# Patient Record
Sex: FEMALE | Race: WHITE | Employment: OTHER | URBAN - METROPOLITAN AREA
[De-identification: names, ages, dates, MRNs, and addresses within clinical notes are randomized per-mention and may not be internally consistent; named-entity substitution may affect disease eponyms.]

---

## 2017-08-07 ENCOUNTER — HOSPITAL ENCOUNTER (OUTPATIENT)
Dept: INTERVENTIONAL RADIOLOGY/VASCULAR | Age: 82
Discharge: HOME | End: 2017-08-07
Attending: ORTHOPAEDIC SURGERY | Admitting: ORTHOPAEDIC SURGERY
Payer: MEDICARE

## 2017-08-07 VITALS
HEIGHT: 64 IN | HEART RATE: 45 BPM | TEMPERATURE: 98.1 F | RESPIRATION RATE: 18 BRPM | SYSTOLIC BLOOD PRESSURE: 153 MMHG | WEIGHT: 117 LBS | OXYGEN SATURATION: 100 % | DIASTOLIC BLOOD PRESSURE: 79 MMHG | BODY MASS INDEX: 19.97 KG/M2

## 2017-08-07 DIAGNOSIS — M48.061 SPINAL STENOSIS, LUMBAR REGION, WITHOUT NEUROGENIC CLAUDICATION: ICD-10-CM

## 2017-08-07 DIAGNOSIS — M54.31 RIGHT SIDED SCIATICA: ICD-10-CM

## 2017-08-07 DIAGNOSIS — M43.06 LUMBAR SPONDYLOLYSIS: ICD-10-CM

## 2017-08-07 PROCEDURE — 64483 NJX AA&/STRD TFRM EPI L/S 1: CPT

## 2017-08-07 PROCEDURE — 74011636320 HC RX REV CODE- 636/320: Performed by: RADIOLOGY

## 2017-08-07 PROCEDURE — 77030003666 HC NDL SPINAL BD -A

## 2017-08-07 PROCEDURE — 74011250636 HC RX REV CODE- 250/636: Performed by: RADIOLOGY

## 2017-08-07 PROCEDURE — 74011000250 HC RX REV CODE- 250: Performed by: RADIOLOGY

## 2017-08-07 RX ORDER — LIDOCAINE HYDROCHLORIDE 10 MG/ML
15 INJECTION, SOLUTION EPIDURAL; INFILTRATION; INTRACAUDAL; PERINEURAL
Status: COMPLETED | OUTPATIENT
Start: 2017-08-07 | End: 2017-08-07

## 2017-08-07 RX ORDER — METHYLPREDNISOLONE ACETATE 40 MG/ML
120 INJECTION, SUSPENSION INTRA-ARTICULAR; INTRALESIONAL; INTRAMUSCULAR; SOFT TISSUE
Status: COMPLETED | OUTPATIENT
Start: 2017-08-07 | End: 2017-08-07

## 2017-08-07 RX ADMIN — METHYLPREDNISOLONE ACETATE 120 MG: 40 INJECTION, SUSPENSION INTRA-ARTICULAR; INTRALESIONAL; INTRAMUSCULAR; SOFT TISSUE at 10:23

## 2017-08-07 RX ADMIN — LIDOCAINE HYDROCHLORIDE 15 ML: 10 INJECTION, SOLUTION EPIDURAL; INFILTRATION; INTRACAUDAL; PERINEURAL at 10:23

## 2017-08-07 RX ADMIN — IOHEXOL 20 ML: 180 INJECTION INTRAVENOUS at 10:23

## 2017-08-07 NOTE — PROGRESS NOTES
0945: pt to angio holding for holly  1030: I have reviewed discharge instructions with the patient. The patient verbalized understanding.

## 2017-08-07 NOTE — PROCEDURES
PROCEDURE:TFESI. INDICATION:right-sided pain. ANESTHESIA:local.  COMPLICATION:NONE. SPECIMENS REMOVED:none. BLOOD LOSS:NONE. /ASSISTANT:TIFFANY Foley RECOMMENDATIONS:none. CONSENT OBTAINED:YES.  NOTES:none.

## 2017-08-07 NOTE — DISCHARGE INSTRUCTIONS
Steroid Injection Discharge Instructions    General Information:   A steroid injection was performed today, placing a combination of a steroid and an anesthetic (numbing medicine) into the space around the nerves of your spine. This is done to treat back pain. It may take 7-10 days for the injection to reach its full potential.  This procedure can be done at any level of the spinal column, depending on where your pain is. Your doctor will have ordered the appropriate level to be treated prior to your coming in for the procedure. Home Care Instructions: You can resume your regular diet. Do not drink alcohol today. You may notice that you have to use your pain medications less after your injection. Some people do not notice much of a change in their pain after the first injection. If that is the case, it is worth your time to have a second one done. This is why these injections are sometimes ordered in a series of three. Keep the puncture site clean and dry for 24 hours, and then you may remove the dressing. Showering is acceptable after the bandage is removed. Rest today be aware there maybe numbness or tingling that may last up to 12 hours after the inject. Call If:   You should call your Physician and/or the Radiology Nurse if you have any bleeding other than a small spot on your bandage. Call if you have any signs of infection, fever, increased pain at the puncture site, confusion, or a headache that worsens when you stand and eases when lying flat. Follow-Up Instructions:  Please see your ordering doctor as he/she has requested. Let your doctor know if you have relief from your pain so they may schedule another injection for you if it is indicated. To Reach Us:  Side effects of sedation medications and other medications used today have been reviewed. Notify us of nausea, itching, hives, dizziness, or anything else out of the ordinary.       Should you experience any of these significant changes, please call [phone] between the hours of 7:30 am and 10 pm or [phone] after hours.  After hours, ask the  to page the [address] Technologist, and describe the problem to the technologist.

## 2019-01-25 ENCOUNTER — HOSPITAL ENCOUNTER (EMERGENCY)
Age: 84
Discharge: HOME | End: 2019-01-25
Attending: EMERGENCY MEDICINE | Admitting: EMERGENCY MEDICINE
Payer: MEDICARE

## 2019-01-25 ENCOUNTER — APPOINTMENT (OUTPATIENT)
Dept: CT IMAGING | Age: 84
End: 2019-01-25
Attending: EMERGENCY MEDICINE
Payer: MEDICARE

## 2019-01-25 VITALS
HEART RATE: 62 BPM | WEIGHT: 114.86 LBS | DIASTOLIC BLOOD PRESSURE: 74 MMHG | HEIGHT: 63 IN | RESPIRATION RATE: 16 BRPM | BODY MASS INDEX: 20.35 KG/M2 | TEMPERATURE: 97.9 F | SYSTOLIC BLOOD PRESSURE: 121 MMHG | OXYGEN SATURATION: 100 %

## 2019-01-25 DIAGNOSIS — S51.811A SKIN TEAR OF RIGHT FOREARM WITHOUT COMPLICATION, INITIAL ENCOUNTER: ICD-10-CM

## 2019-01-25 DIAGNOSIS — S22.000A CLOSED COMPRESSION FRACTURE OF THORACIC VERTEBRA, INITIAL ENCOUNTER (HCC): Primary | ICD-10-CM
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
## 2019-01-25 PROCEDURE — 74011000250 HC RX REV CODE- 250: Performed by: EMERGENCY MEDICINE

## 2019-01-25 PROCEDURE — 74011250637 HC RX REV CODE- 250/637: Performed by: EMERGENCY MEDICINE

## 2019-01-25 PROCEDURE — 72131 CT LUMBAR SPINE W/O DYE: CPT

## 2019-01-25 PROCEDURE — 97161 PT EVAL LOW COMPLEX 20 MIN: CPT

## 2019-01-25 PROCEDURE — 97116 GAIT TRAINING THERAPY: CPT

## 2019-01-25 PROCEDURE — 99285 EMERGENCY DEPT VISIT HI MDM: CPT

## 2019-01-25 RX ORDER — LIDOCAINE 50 MG/G
PATCH TOPICAL
Qty: 1 PACKAGE | Refills: 1 | Status: ON HOLD | OUTPATIENT
Start: 2019-01-25 | End: 2021-03-19

## 2019-01-25 RX ORDER — ACETAMINOPHEN 325 MG/1
650 TABLET ORAL
Status: COMPLETED | OUTPATIENT
Start: 2019-01-25 | End: 2019-01-25

## 2019-01-25 RX ORDER — ACETAMINOPHEN 500 MG
1000 TABLET ORAL 2 TIMES DAILY
COMMUNITY

## 2019-01-25 RX ORDER — IBUPROFEN 400 MG/1
400 TABLET ORAL
Status: COMPLETED | OUTPATIENT
Start: 2019-01-25 | End: 2019-01-25

## 2019-01-25 RX ORDER — LIDOCAINE 50 MG/G
2 PATCH TOPICAL EVERY 24 HOURS
Status: DISCONTINUED | OUTPATIENT
Start: 2019-01-25 | End: 2019-01-25 | Stop reason: HOSPADM

## 2019-01-25 RX ADMIN — ACETAMINOPHEN 650 MG: 325 TABLET ORAL at 10:17

## 2019-01-25 RX ADMIN — IBUPROFEN 400 MG: 400 TABLET, FILM COATED ORAL at 10:17

## 2019-01-25 NOTE — ED NOTES
Bedside and Verbal shift change report given to Nuzhat Godinez RN (oncoming nurse) by Caleb Escobedo RN (offgoing nurse). Report included the following information SBAR, ED Summary, MAR and Recent Results.

## 2019-01-25 NOTE — ED TRIAGE NOTES
Pt arrives via EMS from [facility] for GLF this morning. Pt reports falling backwards onto the floor. Denies LOC, anticoagulants. Pts only complaint is lower back pain

## 2019-01-25 NOTE — ED NOTES
1200: Report received from Shelvia Cogan, RN. Patient requesting to speak to MD for results. MD notified. PT called to ambulate patient. 1300: PT in room to ambulate patient. 1330: The MD has reviewed discharge instructions with the patient. The patient verbalized understanding. Patient waiting for room assignment at assisted living, see BSW note. 1415: Report called to Sancho Zaragoza RN. I have reviewed discharge instructions with the patient and daughter. The patient verbalized understanding. Daughter to drive patient back to facility.

## 2019-01-25 NOTE — DISCHARGE INSTRUCTIONS
Patient Education        Compression Fracture of the Spine: Care Instructions  Your Care Instructions    A compression fracture happens when the front part of a spinal bone breaks and collapses. A fall or other accident can cause it. A minor injury or moving the wrong way can cause a break if you have thin or brittle bones (osteoporosis). These types of breaks will heal in 8 to 10 weeks. You will need rest and pain medicines. Your doctor may recommend physical therapy. Some doctors recommend that certain people with compression fractures wear braces. Your doctor also may treat thin or brittle bones. You may need surgery if you have lasting pain or if the bone presses on the spinal cord or nerves. You heal best when you take good care of yourself. Eat a variety of healthy foods, and don't smoke. Follow-up care is a key part of your treatment and safety. Be sure to make and go to all appointments, and call your doctor if you are having problems. It's also a good idea to know your test results and keep a list of the medicines you take. How can you care for yourself at home? · Be safe with medicines. Read and follow all instructions on the label. ? If the doctor gave you a prescription medicine for pain, take it as prescribed. ? If you are not taking a prescription pain medicine, ask your doctor if you can take an over-the-counter medicine. · Talk to your doctor about how to make your bones stronger. You may need medicines or a change in what you eat. · Be active only as directed by your doctor. When should you call for help? Call 911 anytime you think you may need emergency care. For example, call if:    · You are unable to move an arm or a leg at all.   [facility] your doctor now or seek immediate medical care if:    · You have new or worse symptoms in your arms, legs, belly, or buttocks. Symptoms may include:  ? Numbness or tingling. ? Weakness.   ? Pain.     · You lose bladder or bowel control.     · You have belly pain, bloating, vomiting, or nausea.    Watch closely for changes in your health, and be sure to contact your doctor if:    · You do not get better as expected. Where can you learn more? Go to http://isidra-monico.info/. Enter P445 in the search box to learn more about \"Compression Fracture of the Spine: Care Instructions. \"  Current as of: September 20, 2018  Content Version: 11.9  © 6146-7548 [employer]. Care instructions adapted under license by [employer] (which disclaims liability or warranty for this information). If you have questions about a medical condition or this instruction, always ask your healthcare professional. [place] [address] any warranty or liability for your use of this information.

## 2019-01-25 NOTE — PROGRESS NOTES
Date of previous inpatient admission/ ED visit? 8/7/17 OP Procedure What brought the patient back to ED? Patient presents to the ED s/p fall Did patient decline recommended services during last admission/ ED visit (if yes, what)? NA Has patient seen a provider since their last inpatient admission/ED visit (if yes, when)? Yes. PCP is Abby Downing seen on 1/24/19 at [facility] CM Interventions: 
From previous inpatient admission/ED visit: NA From current inpatient admission/ED visit: Assessment SSED/CM consult received and appreciated. EMR reviewed. Case discussed w/ Dr.Jeffery Purvis and Mac Byrnes, PT - recommendation admit to [facility] . Met w/ patient and daughter/WEN Shankar Host - introduced to role of CM. Patient provided history - lives in independent living at [place] [address] since 2014. DME includes a rollator. Previous Healthcare stay June 2018 (carpal tunnel surgery) and has had OP PT on site. Call placed to [place] [address] attempted to leave message for SW. Call placed back and connected to Felix Huang [phone] informed of bed needed. Medicals can be faxed to [phone]. SW to contact this writer back w/ available bed and Toni Baugh will drive to facility once other daughter brings clothes. No additional transitional care needs verbalized. Order, Facesheet, and AVS faxed to facility via [employer]. [employer] is insurance provider. Care Management Interventions PCP Verified by CM: Yes Last Visit to PCP: 01/24/19 Palliative Care Criteria Met (RRAT>21 & CHF Dx)?: No 
Transition of Care Consult (CM Consult): Discharge Planning, SNF MyChart Signup: No 
Discharge Durable Medical Equipment: No 
Health Maintenance Reviewed: Yes Physical Therapy Consult: Yes Occupational Therapy Consult: No 
Speech Therapy Consult: No 
Current Support Network: Lives Alone Confirm Follow Up Transport: Family Plan discussed with Pt/Family/Caregiver: Yes Freedom of Choice Offered:  Yes 
 [employer] Information Provided?: No 
Discharge Location Discharge Placement: Skilled nursing facility Room [facility]. [phone] obtained. Report will need to be called to Nurse Smita Salas [phone] Updates provided to patient/ MPOA.

## 2019-01-25 NOTE — ED PROVIDER NOTES
80 y.o. female with past medical history significant for scoliosis who presents from [facility] via EMS with chief complaint of severe back pain s/p GLF. Patient states this morning, she lost her balance while putting on her robe, causing her to fall backwards. Patient reports landing flat on her back, denying head injury or LOC. Patient arrives to the ED complaining of bilateral lower back pain, with a skin tear to her right forearm. Patient is unsure of her last tetanus shot. Patient reports having a Rolator to use at home. Patient denies any other pain or injuries. Pt denies any anticoagulation use. There are no other acute medical concerns at this time. Old Chart Review: On 8/7/17, the patient had a TFESI at [facility]. Social hx: former smoker; Current EtOH use PCP: Barbara Olivares MD 
Ortho: Deborah Cyr MD 
 
Note written by Brody Abdi, as dictated by Liborio Peraza,  10:03 AM 
 
 
The history is provided by the patient, medical records and the EMS personnel. No  was used. Past Medical History:  
Diagnosis Date  Scoliosis Past Surgical History:  
Procedure Laterality Date  HX ORTHOPAEDIC History reviewed. No pertinent family history. Social History Socioeconomic History  Marital status:  Spouse name: Not on file  Number of children: Not on file  Years of education: Not on file  Highest education level: Not on file Social Needs  Financial resource strain: Not on file  Food insecurity - worry: Not on file  Food insecurity - inability: Not on file  Transportation needs - medical: Not on file  Transportation needs - non-medical: Not on file Occupational History  Not on file Tobacco Use  Smoking status: Former Smoker  Smokeless tobacco: Never Used Substance and Sexual Activity  Alcohol use: Yes Alcohol/week: 4.2 oz Types: 7 Glasses of wine per week Frequency: Never  Drug use: No  
 Sexual activity: Not on file Other Topics Concern  Not on file Social History Narrative  Not on file ALLERGIES: Patient has no known allergies. Review of Systems Cardiovascular: Negative for chest pain. Gastrointestinal: Negative for abdominal pain. Musculoskeletal: Positive for back pain. Negative for arthralgias and myalgias. Skin: Positive for wound. Neurological: Negative for syncope and headaches. All other systems reviewed and are negative. Vitals:  
 01/25/19 1002 BP: 148/68 Pulse: (!) 59 Resp: 16 Temp: 97.9 °F (36.6 °C) SpO2: 100% Weight: 52.1 kg (114 lb 13.8 oz) Height: 5' 3\" (1.6 m) Physical Exam  
Skin: Skin is warm and dry. Abrasion and bruising noted. She is not diaphoretic. Constitutional: Pt is awake and alert. NAD. Elderly, frail, and thin. HENT:  
Head: Normocephalic and atraumatic. Nose: Nose normal.  
Mouth/Throat: Oropharynx is clear and moist. No oropharyngeal exudate. Eyes: Conjunctivae and extraocular motions are normal. Pupils are equal, round, and reactive to light. Right eye exhibits no discharge. Left eye exhibits no discharge. No scleral icterus. Neck: No tracheal deviation present. Supple neck. Cardiovascular: Normal rate, regular rhythm, normal heart sounds and intact distal pulses. Exam reveals no gallop and no friction rub. No murmur heard. Pulmonary/Chest: Effort normal and breath sounds normal.  Pt  has no wheezes. Pt  has no rales. Abdominal: Soft. Pt  exhibits no distension and no mass. No tenderness. Pt  has no rebound and no guarding. Musculoskeletal:  Pt  exhibits no edema. FROM of ankles, knees, and hips without pain. Midline lumbar spine tenderness to palpation, no bruising or signs of trauma noted. Ext: Normal ROM in all four extremities; not tender to palpation; distal pulses are normal, no edema. Neurological:  Pt is alert. nonfocal neuro exam. 
Psychiatric:  Pt  has a normal mood and affect. Behavior is normal.  
 
Note written by Brody Jay, as dictated by Leanna Mallory DO 10:03 AM 
 
MDM Procedures 12:04 PM 
Reviewed the patient's CT images. She has a T12 compression fracture. 12:10 PM 
Updated the patient of her results. Will consult Case Management to assist with dispo. 1:18 PM 
Case Management is consulted regarding placing pt in a healthcare bed at [facility]. CONSULT NOTE: 
1:24 PM Leanna Mallory DO spoke with Dr. Rosario Tate, Consult for Neurosurgery. Discussed available diagnostic tests and clinical findings. Dr. Rosario Tate recommends 2 week follow up in the office. Regarding T12 compression fx, definitive fx care given. Yonathan Villafuerte DO Consulted PT as well Discussed plan and f/u with daughter - gave her copy of AVS. 
 
Will try lidoderm patches, and otc pain meds for now. Dr can order opiate if needed. I am avoiding opiates due to her frail, elderly status - d/w daughter.

## 2019-01-26 NOTE — PROGRESS NOTES
physical Therapy Emergency Department EVALUATION/DISCHARGE Patient: Esthela Mckeon (89 y.o. female) Date: 1/25/2019 Primary Diagnosis: No admission diagnoses are documented for this encounter. Precautions: Fall ASSESSMENT : 
Patient is ambulatory with her rollator walker in the ED with safety concerns related to her technique (tends to park the walker and attempt to walk a short distance to reach the bed and toilet during this visit). She was not using her walker when she fell earlier today. She requires assistance for LE management during bed mobility and with sit to stand today which will make it difficult for her to manage alone in her independent living apartment. Patient is appropriate to return to the healthcare unit at her [address] where she will have assistance of staff and receive therapy before returning to her  apartment. Further acute physical therapy is not indicated at this time. PLAN : 
Discharge Recommendations:  
 
[]   Home with family []   Skilled nursing facility []   Admission to hospital with rehab likely needed 
[]   Inpatient rehab referral 
[]   Outpatient physical therapy referral 
[x]   Other: Healthcare Further Equipment Recommendations for Discharge:  None 
[]   Rolling walker with 5\" wheels 
[]   Crutches  
[]   Cane  
[]   Wheelchair  
[]   Other: COMMUNICATION/EDUCATION:  
Communication/Collaboration: 
[x]   Fall prevention education was provided and the patient/caregiver indicated understanding. [x]   Patient/family have participated as able and agree with findings and recommendations. []   Patient is unable to participate in plan of care at this time. Findings and recommendations were discussed with: Nursing and Care Management. Nursing to provide report to MD.  
 
 
SUBJECTIVE:  
Patient stated Rosalynd Favors you take this off so I can go home (referring to leads, bp cuff).  OBJECTIVE DATA SUMMARY:  
HISTORY:   
 Past Medical History:  
Diagnosis Date  Scoliosis Past Surgical History:  
Procedure Laterality Date  HX ORTHOPAEDIC Prior Level of Function/Home Situation: Independent with rollator walker. Does not use the walker inside her apartment. Denies any other history of falls. Personal factors and/or comorbidities impacting plan of care:  
 
Home Situation Home Environment: Independent living([facility]) # Steps to Enter: 0 One/Two Story Residence: One story(accesible by elevator) Living Alone: Yes Support Systems: Child(kallie) Patient Expects to be Discharged to[de-identified] Other (comment) Current DME Used/Available at Home: [employer]) EXAMINATION/PRESENTATION/DECISION MAKING: Critical Behavior: 
Neurologic State: Alert, Eyes open spontaneously Orientation Level: Oriented X4 Cognition: Follows commands Hearing: Auditory Auditory Impairment: None Range Of Motion: 
AROM: Generally decreased, functional 
  
  
  
  
  
  
  
Strength:   
Strength: Generally decreased, functional 
  
  
  
  
  
  
Tone & Sensation:  
Tone: Normal 
  
  
  
  
Sensation: Intact Coordination: 
Coordination: Within functional limits Vision:  
  
Functional Mobility: 
Bed Mobility: 
  
Supine to Sit: Moderate assistance Scooting: Moderate assistance Transfers: 
Sit to Stand: Minimum assistance Stand to Sit: Minimum assistance Balance:  
Sitting: Intact Standing: Intact; With support Ambulation/Gait Training: 
Distance (ft): 75 Feet (ft) Assistive Device: Gait belt;Walker, rollator Ambulation - Level of Assistance: Supervision;Contact guard assistance Gait Description (WDL): Exceptions to [facility] Gait Abnormalities: Antalgic Base of Support: Widened Step Length: Left shortened;Right shortened Interventions: Verbal cues(to correct walker placement and use) Special Tests: 
Timed up and go: Timed Get Up And Go Test: > 30 seconds < than 10 seconds=Normal  Greater then 13.5 seconds (in elderly)=Increased fall risk Sophia DEY. Predicting the probability for falls in community dwelling older adults using the Timed Up and Go Test. Phys Ther. 2000;80:896-903. Physical Therapy Evaluation Charge Determination History Examination Presentation Decision-Making LOW Complexity : Zero comorbidities / personal factors that will impact the outcome / POC LOW Complexity : 1-2 Standardized tests and measures addressing body structure, function, activity limitation and / or participation in recreation  LOW Complexity : Stable, uncomplicated  LOW Complexity : FOTO score of  Based on the above components, the patient evaluation is determined to be of the following complexity level: LOW Pain: 
Pain Scale 1: Numeric (0 - 10) Pain Intensity 1: 4 Pain Location 1: Back Pain Orientation 1: Lower Activity Tolerance:  
Pain limits bed mobility and transfers. Tolerated walking fairly well with less pain complaints. Please refer to the flowsheet for vital signs taken during this treatment. After treatment:  
[x]         Patient left in no apparent distress sitting up in chair 
[]         Patient left in no apparent distress in bed 
[x]         Call bell left within reach [x]         Nursing notified 
[x]         Caregiver present 
[]         Bed alarm activated Thank you for this referral. 
Radha Hilliard, PT Time Calculation: 30 mins

## 2019-04-02 ENCOUNTER — OFFICE VISIT (OUTPATIENT)
Dept: NEUROLOGY | Age: 84
End: 2019-04-02

## 2019-04-02 ENCOUNTER — TELEPHONE (OUTPATIENT)
Dept: NEUROLOGY | Age: 84
End: 2019-04-02

## 2019-04-02 VITALS
DIASTOLIC BLOOD PRESSURE: 72 MMHG | WEIGHT: 108 LBS | BODY MASS INDEX: 19.88 KG/M2 | HEART RATE: 57 BPM | SYSTOLIC BLOOD PRESSURE: 138 MMHG | HEIGHT: 62 IN

## 2019-04-02 DIAGNOSIS — R20.2 NUMBNESS AND TINGLING: Primary | ICD-10-CM

## 2019-04-02 DIAGNOSIS — R20.0 NUMBNESS AND TINGLING: Primary | ICD-10-CM

## 2019-04-02 DIAGNOSIS — G20 PD (PARKINSON'S DISEASE) (HCC): ICD-10-CM

## 2019-04-02 RX ORDER — DORZOLAMIDE HYDROCHLORIDE AND TIMOLOL MALEATE 20; 5 MG/ML; MG/ML
1 SOLUTION/ DROPS OPHTHALMIC 2 TIMES DAILY
COMMUNITY

## 2019-04-02 RX ORDER — TRAMADOL HYDROCHLORIDE 50 MG/1
50 TABLET ORAL
Status: ON HOLD | COMMUNITY
End: 2021-03-19

## 2019-04-02 RX ORDER — MELOXICAM 7.5 MG/1
7.5 TABLET ORAL DAILY
COMMUNITY

## 2019-04-02 RX ORDER — CARBIDOPA AND LEVODOPA 25; 100 MG/1; MG/1
1 TABLET ORAL 3 TIMES DAILY
Qty: 90 TAB | Refills: 2 | Status: SHIPPED | OUTPATIENT
Start: 2019-04-02 | End: 2019-06-25 | Stop reason: SDUPTHER

## 2019-04-02 RX ORDER — CYANOCOBALAMIN 1000 UG/ML
1000 INJECTION, SOLUTION INTRAMUSCULAR; SUBCUTANEOUS EVERY 2 WEEKS
COMMUNITY

## 2019-04-02 RX ORDER — LOPERAMIDE HYDROCHLORIDE 2 MG/1
2 CAPSULE ORAL
COMMUNITY

## 2019-04-02 RX ORDER — DOCUSATE SODIUM 100 MG/1
100 CAPSULE, LIQUID FILLED ORAL
COMMUNITY

## 2019-04-02 RX ORDER — ACETAMINOPHEN 325 MG/1
TABLET ORAL
Status: ON HOLD | COMMUNITY
End: 2021-03-19

## 2019-04-02 RX ORDER — OMEPRAZOLE 20 MG/1
20 CAPSULE, DELAYED RELEASE ORAL DAILY
COMMUNITY

## 2019-04-02 NOTE — PROGRESS NOTES
Neurology Note    Chief Complaint   Patient presents with    Follow-up     dizzy       HPI/Subjective  Nati Henderson is a 80 y.o. female who presented to the neurology office for management of dizziness. Patient states that it is off and on. The daughter is there as well which states patient is having shuffling gait. Has been gradually progressing. Has been going on for the last 1 year or so. She is slower in doing activity. She has a hard time getting in and out of car. She is slowly meeting was well. It takes her longer for dressing. The patient states that everything is fine. The patient recently had a fall and hit her head backwards. She does also have poor balance. The patient also states that she has abnormal sensation in her toes. Patient has been referred here for further management    Current Outpatient Medications   Medication Sig    acetaminophen (TYLENOL) 325 mg tablet Take  by mouth every four (4) hours as needed for Pain.  cyanocobalamin (VITAMIN B12) 1,000 mcg/mL injection 1,000 mcg by IntraMUSCular route once. IM every 12 weeks    docusate sodium (COLACE) 100 mg capsule Take 100 mg by mouth two (2) times a day.  dorzolamide-timolol (COSOPT) 22.3-6.8 mg/mL ophthalmic solution Administer 1 Drop to both eyes two (2) times a day.  loperamide (IMODIUM) 2 mg capsule Take  by mouth.  traMADol (ULTRAM) 50 mg tablet Take 50 mg by mouth every six (6) hours as needed for Pain.  meloxicam (MOBIC) 7.5 mg tablet Take  by mouth daily.  omeprazole (PRILOSEC) 20 mg capsule Take 20 mg by mouth daily.  carbidopa-levodopa (SINEMET)  mg per tablet Take 1 Tab by mouth three (3) times daily. No current facility-administered medications for this visit.       Allergies   Allergen Reactions    Codeine Nausea Only    Erythromycin Nausea Only    Zithromax [Azithromycin] Nausea Only     Past Medical History:   Diagnosis Date    Arthritis      No past surgical history on file.  No family history on file. Social History     Tobacco Use    Smoking status: Not on file   Substance Use Topics    Alcohol use: Not on file    Drug use: Not on file       REVIEW OF SYSTEMS:   A ten system review of constitutional, cardiovascular, respiratory, musculoskeletal, endocrine, skin, SHEENT, genitourinary, psychiatric and neurologic systems was obtained and is unremarkable with the exception of Anxiety, neck swelling, joint pain, muscle weakness, neuropathy and vertigo      EXAMINATION:   Visit Vitals  /72   Pulse (!) 57   Ht 5' 2\" (1.575 m)   Wt 108 lb (49 kg)   BMI 19.75 kg/m²        General:   General appearance: Pt is in no acute distress   Distal pulses are preserved  Fundoscopic Exam: Normal    Neurological Examination:   Mental Status: AAO x3. Speech is fluent. Follows commands, has normal fund of knowledge, attention, short term recall, comprehension and insight. Cranial Nerves: Visual fields are full. PERRL, Extraocular movements are full. Facial sensation intact. Facial movement intact. Hearing intact to conversation. Palate elevates symmetrically. Shoulder shrug symmetric. Tongue midline. Motor: Strength is 5/5 in all 4 ext. No atrophy. Tone: Cogwheel rigidity    Sensation: Decreased vibration sensation at toes    Reflexes: DTRs 2+ with 1+ at knees and 1+ at ankles. Coordination/Cerebellar: Intact to finger-nose-finger     Gait: Ataxic and needs to hold my hand for walking. At baseline she uses a wheeled walker    Skin: No significant bruising or lacerations. Pull test is positive. Fast finger tapping is slow    Laboratory review:   No results found for this or any previous visit. Imaging review:  None    Documentation review:  None    Assessment/Plan:   Julia Dykes is a 80 y.o. female who presented to the neurology office for dizziness. Based on history she does have shuffling gait and bradykinesia.   On examination I noticed cogwheel rigidity and bradykinesia on fast finger tapping. Pull test is positive. I do suspect Parkinson's disease. We will start the patient on Sinemet 25/100 mg p.o. 3 times daily. If there is no improvement will send the patient for a DaTscan at [facility]. 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 We will scheduled her for EMG/nerve conduction study. Follow-up in 3 months    No flowsheet data found. Primary care to address possible depression if PHQ-9 score is more than 9. ICD-10-CM ICD-9-CM    1. Numbness and tingling R20.0 782. 0 EMG LIMITED    R20.2     2. PD (Parkinson's disease) ([facility] G20 332.0 carbidopa-levodopa (SINEMET)  mg per tablet      Thank you for allowing me to participate in the care of Ms. Olegario Castanon. Please feel free to contact me if you have any questions. Lorenzo Duarte MD  Neurologist    CC: Ana Paula Reynolds MD  Fax: [phone]    This note was created using voice recognition software. Despite editing, there may be syntax errors.

## 2019-04-02 NOTE — TELEPHONE ENCOUNTER
Patients daughter would like to know if massage therapy would help the patients neck stiffness.   Dr Karli Daniel please advise

## 2019-04-02 NOTE — LETTER
4/2/19 Patient: Dayton Monsivais YOB: 1931 Date of Visit: 4/2/2019 Ange Song MD 
[address] [place] [id number] VIA Facsimile: [phone] Dear Ange Song MD, Thank you for referring Ms. Lyly Salcido to [address] [place] [address] for evaluation. My notes for this consultation are attached. If you have questions, please do not hesitate to call me. I look forward to following your patient along with you. Sincerely, Francia Kline MD

## 2019-05-09 ENCOUNTER — OFFICE VISIT (OUTPATIENT)
Dept: NEUROLOGY | Age: 84
End: 2019-05-09

## 2019-05-09 VITALS
OXYGEN SATURATION: 97 % | BODY MASS INDEX: 19.88 KG/M2 | DIASTOLIC BLOOD PRESSURE: 78 MMHG | WEIGHT: 108 LBS | HEART RATE: 62 BPM | SYSTOLIC BLOOD PRESSURE: 133 MMHG | HEIGHT: 62 IN

## 2019-05-09 DIAGNOSIS — R79.9 ABNORMAL FINDING OF BLOOD CHEMISTRY: ICD-10-CM

## 2019-05-09 DIAGNOSIS — R20.2 NUMBNESS AND TINGLING: ICD-10-CM

## 2019-05-09 DIAGNOSIS — G20 PD (PARKINSON'S DISEASE) (HCC): Primary | ICD-10-CM

## 2019-05-09 DIAGNOSIS — R20.0 NUMBNESS AND TINGLING: ICD-10-CM

## 2019-05-09 DIAGNOSIS — G60.9 IDIOPATHIC PERIPHERAL NEUROPATHY: ICD-10-CM

## 2019-05-09 NOTE — PROGRESS NOTES
Neurology Note    Chief Complaint   Patient presents with    Procedure     EMG       HPI/Subjective  Jin Bhat is a 80 y.o. female who presented to the neurology office for management of dizziness. Patient states that it is off and on. The daughter is there as well which states patient is having shuffling gait. Has been gradually progressing. Has been going on for the last 1 year or so. She is slower in doing activity. She has a hard time getting in and out of car. She is slowly meeting was well. It takes her longer for dressing. The patient states that everything is fine. The patient recently had a fall and hit her head backwards. She does also have poor balance. The patient also states that she has abnormal sensation in her toes. Patient has been referred here for further management    Interval history:  Patient is on Sinemet 25/100 mg p.o. 3 times daily and there has been improvement in her gait and bradykinesia. Patient had EMG/nerve conduction study done today which does show mild peripheral neuropathy    Current Outpatient Medications   Medication Sig    acetaminophen (TYLENOL) 325 mg tablet Take  by mouth every four (4) hours as needed for Pain.  cyanocobalamin (VITAMIN B12) 1,000 mcg/mL injection 1,000 mcg by IntraMUSCular route once. IM every 12 weeks    docusate sodium (COLACE) 100 mg capsule Take 100 mg by mouth two (2) times a day.  dorzolamide-timolol (COSOPT) 22.3-6.8 mg/mL ophthalmic solution Administer 1 Drop to both eyes two (2) times a day.  loperamide (IMODIUM) 2 mg capsule Take  by mouth.  traMADol (ULTRAM) 50 mg tablet Take 50 mg by mouth every six (6) hours as needed for Pain.  meloxicam (MOBIC) 7.5 mg tablet Take  by mouth daily.  omeprazole (PRILOSEC) 20 mg capsule Take 20 mg by mouth daily.  carbidopa-levodopa (SINEMET)  mg per tablet Take 1 Tab by mouth three (3) times daily.     acetaminophen (TYLENOL EXTRA STRENGTH) 500 mg tablet Take 1,000 mg by mouth two (2) times a day.  lidocaine (LIDODERM) 5 % Apply patch to the affected area for 12 hours a day and remove for 12 hours a day. No current facility-administered medications for this visit. Allergies   Allergen Reactions    Codeine Nausea Only    Erythromycin Nausea Only    Zithromax [Azithromycin] Nausea Only     Past Medical History:   Diagnosis Date    Arthritis     Scoliosis      Past Surgical History:   Procedure Laterality Date    HX ORTHOPAEDIC       No family history on file. Social History     Tobacco Use    Smoking status: Former Smoker   Substance Use Topics    Alcohol use: Yes     Alcohol/week: 4.2 oz     Types: 7 Glasses of wine per week     Frequency: Never    Drug use: No       REVIEW OF SYSTEMS:   A ten system review of constitutional, cardiovascular, respiratory, musculoskeletal, endocrine, skin, SHEENT, genitourinary, psychiatric and neurologic systems was obtained and is unremarkable with the exception of Anxiety, neck swelling, joint pain, muscle weakness, neuropathy and vertigo      EXAMINATION:   Visit Vitals  /78 (BP 1 Location: Left arm, BP Patient Position: Sitting)   Pulse 62   Ht 5' 2\" (1.575 m)   Wt 108 lb (49 kg)   SpO2 97%   BMI 19.75 kg/m²        General:   General appearance: Pt is in no acute distress   Distal pulses are preserved  Fundoscopic Exam: Normal    Neurological Examination:   Mental Status: AAO x3. Speech is fluent. Follows commands, has normal fund of knowledge, attention, short term recall, comprehension and insight. Cranial Nerves: Visual fields are full. PERRL, Extraocular movements are full. Facial sensation intact. Facial movement intact. Hearing intact to conversation. Palate elevates symmetrically. Shoulder shrug symmetric. Tongue midline. Motor: Strength is 5/5 in all 4 ext. No atrophy.      Tone: Cogwheel rigidity    Sensation: Decreased vibration sensation at toes    Reflexes: DTRs 2+ with 1+ at knees and 1+ at ankles. Coordination/Cerebellar: Intact to finger-nose-finger     Gait: Ataxic and needs to hold my hand for walking. At baseline she uses a wheeled walker    Skin: No significant bruising or lacerations. Pull test is positive. Fast finger tapping is slow    Laboratory review:   No results found for this or any previous visit. Imaging review:  5/9/2019  EMG/nerve conduction study  Mild length dependent axonal polyneuropathy    Documentation review:  None    Assessment/Plan:   1. PD (Parkinson's disease) ([facility]  Patient is now on Sinemet 25/100 mg p.o. 3 times daily. There has been improvement in her bradykinesia and gait. We will continue the same. No need for DaTscan at this time. 2. Idiopathic peripheral neuropathy  EMG/nerve conduction study done today showed length dependent axonal polyneuropathy. It is mild in nature. Will get blood work to evaluate for the.    - VITAMIN B12  - TSH AND FREE T4  - SPEP AND MONTSE, SERUM  - METHYLMALONIC ACID  - HEMOGLOBIN A1C W/O EAG  - CRP, HIGH SENSITIVITY  - METABOLIC PANEL, COMPREHENSIVE  - CBC WITH AUTOMATED DIFF  - NOEL COMPREHENSIVE PLUS PANEL  - ANGIOTENSIN CONVERTING ENZYME    3. Abnormal finding of blood chemistry     - HEMOGLOBIN A1C W/O EAG    Follow-up in 3 months      No flowsheet data found. Primary care to address possible depression if PHQ-9 score is more than 9. ICD-10-CM ICD-9-CM    1. PD (Parkinson's disease) ([facility] G20 332.0    2. Idiopathic peripheral neuropathy G60.9 356.9 VITAMIN B12      TSH AND FREE T4      SPEP AND MONTSE, SERUM      METHYLMALONIC ACID      HEMOGLOBIN A1C W/O EAG      CRP, HIGH SENSITIVITY      METABOLIC PANEL, COMPREHENSIVE      CBC WITH AUTOMATED DIFF      NOEL COMPREHENSIVE PLUS PANEL      ANGIOTENSIN CONVERTING ENZYME   3. Abnormal finding of blood chemistry  R79.9 790.6 HEMOGLOBIN A1C W/O EAG   4.  Numbness and tingling R20.0 782.0     R20.2        Thank you for allowing me to participate in the care of Ms. Nichole Dodge. Please feel free to contact me if you have any questions. Paulo Barnes MD  Neurologist    CC: Jessica Kramer MD  Fax: [phone]    This note was created using voice recognition software. Despite editing, there may be syntax errors.

## 2019-05-09 NOTE — PROCEDURES
[facility] at [facility]        Tel: [phone] ? Fax: [phone]    ELECTRODIAGNOSTIC REPORT      Test Date:  2019    Patient: Ilda Lara : 1931 Physician: Dorcas Vera M.D.   ID#: [id number] SEX: Female Ref. Phys:      Patient History / Exam:  Mohsen Norris 80 y.o. female presents with leg numbness. Query peripheral neuropathy. EMG & NCV Findings:  Evaluation of the right Fibular motor nerve showed normal distal onset latency (3.3 ms), normal amplitude (2.4 mV), normal conduction velocity (B Fib-Ankle, 51 m/s), and normal conduction velocity (Poplt-B Fib, 59 m/s). The right tibial motor nerve showed normal distal onset latency (6.0 ms), normal amplitude (5.8 mV), and normal conduction velocity (Knee-Ankle, 516 m/s). The right ulnar motor nerve showed normal distal onset latency (3.0 ms), normal amplitude (8.2 mV), normal conduction velocity (B Elbow-Wrist, 65 m/s), and normal conduction velocity (A Elbow-B Elbow, 71 m/s). The right radial sensory nerve showed normal distal peak latency (1.9 ms) and normal amplitude (27.3 µV). The right Sup Fibular sensory nerve showed no response (Lower leg). The right sural sensory nerve showed no response (Calf). All F Wave latencies were within normal limits. All examined muscles (as indicated in the following table) showed no evidence of electrical instability. Impression: This is an abnormal study. There is electrophysiological evidence of a mild length dependent axonal polyneuropathy      ___________________________  S.  Lillian Sanchez M.D.    Nerve Conduction Studies  Anti Sensory Summary Table     Stim Site NR Peak (ms) Norm Peak (ms) P-T Amp (µV) Norm P-T Amp Site1 Site2 Dist (cm)   Right Radial Anti Sensory (Base 1st Digit)  33°C   Wrist    1.9 <2.8 27.3 >11 Wrist Base 1st Digit 10.0   Right Sup Fibular Anti Sensory (Lat ankle)  32.5°C   Lower leg NR  <4.6  >4 Lower leg Lat ankle 10.0   Right Sural Anti Sensory (Lat Mall)  32.4°C   Calf NR  <4.5  >4.0 Calf Lat Mall 14.0     Motor Summary Table     Stim Site NR Onset (ms) Norm Onset (ms) O-P Amp (mV) Norm O-P Amp P-T Amp (mV) Site1 Site2 Dist (cm) Alek (m/s)   Right Fibular Motor (Ext Dig Brev)  30.8°C   Ankle    3.3 <6.5 2.4 >1.1 4.0 Ankle Ext Dig Brev 8.0    B Fib    9.6  2.1  3.2 B Fib Ankle 32.0 51   Poplt    11.3  2.1  3.1 Poplt B Fib 10.0 59   Right Tibial Motor (Abd Smith Brev)  31.8°C   Ankle    6.0 <6.1 5.8 >1.1 9.1 Ankle Abd Smith Brev 8.0    Knee    13.3  1.3  1.9 Knee Ankle 377.0 516   Right Ulnar Motor (Abd Dig Minimi)  32.9°C   Wrist    3.0 <3.1 8.2 >7.0 11.1 Wrist Abd Dig Minimi 8.0    B Elbow    6.1  7.3  9.9 B Elbow Wrist 20.0 65   A Elbow    7.5  7.2  10.0 A Elbow B Elbow 10.0 71     F Wave Studies     NR F-Lat (ms) Lat Norm (ms) L-R F-Lat (ms) L-R Lat Norm   Right Tibial (Mrkrs) (Abd Hallucis)  32.1°C      49.66 <56  <5.7       EMG     Side Muscle Nerve Root Ins Act Fibs Psw Recrt Duration Amp Poly Comment   Right AntTibialis Dp Br Peron L4-5 Nml Nml Nml Nml Nml Nml Nml    Right MedGastroc Tibial S1-2 Nml Nml Nml Nml Nml Nml Nml    Right VastusLat Femoral L2-4 Nml Nml Nml Nml Nml Nml Nml    Right BicepsFemS Sciatic L5-S1 Nml Nml Nml Nml Nml Nml Nml    Right Tensor Fascia Belén Sciatic L5 Nml Nml Nml Nml Nml Nml Nml      Waveforms:

## 2019-06-20 ENCOUNTER — TELEPHONE (OUTPATIENT)
Dept: NEUROLOGY | Age: 84
End: 2019-06-20

## 2019-06-25 DIAGNOSIS — G20 PD (PARKINSON'S DISEASE) (HCC): ICD-10-CM

## 2019-06-28 RX ORDER — CARBIDOPA AND LEVODOPA 25; 100 MG/1; MG/1
TABLET ORAL
Qty: 90 TAB | Refills: 11 | Status: SHIPPED | OUTPATIENT
Start: 2019-06-28

## 2021-03-18 ENCOUNTER — APPOINTMENT (OUTPATIENT)
Dept: GENERAL RADIOLOGY | Age: 86
DRG: 481 | End: 2021-03-18
Attending: EMERGENCY MEDICINE
Payer: MEDICARE

## 2021-03-18 ENCOUNTER — APPOINTMENT (OUTPATIENT)
Dept: GENERAL RADIOLOGY | Age: 86
DRG: 481 | End: 2021-03-18
Attending: PHYSICIAN ASSISTANT
Payer: MEDICARE

## 2021-03-18 ENCOUNTER — HOSPITAL ENCOUNTER (INPATIENT)
Age: 86
LOS: 6 days | Discharge: NURSING, REHAB OR LONG-TERM CARE | DRG: 481 | End: 2021-03-24
Attending: EMERGENCY MEDICINE | Admitting: FAMILY MEDICINE
Payer: MEDICARE

## 2021-03-18 DIAGNOSIS — S72.002A CLOSED FRACTURE OF LEFT HIP, INITIAL ENCOUNTER (HCC): Primary | ICD-10-CM
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
## 2021-03-18 PROBLEM — S72.009A HIP FRACTURE (HCC): Status: ACTIVE | Noted: 2021-03-18

## 2021-03-18 LAB
ALBUMIN SERPL-MCNC: 3.7 G/DL (ref 3.5–5)
ALBUMIN/GLOB SERPL: 1.1 {RATIO} (ref 1.1–2.2)
ALP SERPL-CCNC: 122 U/L (ref 45–117)
ALT SERPL-CCNC: 24 U/L (ref 12–78)
ANION GAP SERPL CALC-SCNC: 4 MMOL/L (ref 5–15)
APPEARANCE UR: CLEAR
AST SERPL-CCNC: 20 U/L (ref 15–37)
ATRIAL RATE: 68 BPM
ATRIAL RATE: 68 BPM
BACTERIA URNS QL MICRO: NEGATIVE /HPF
BASOPHILS # BLD: 0.1 K/UL (ref 0–0.1)
BASOPHILS NFR BLD: 1 % (ref 0–1)
BILIRUB SERPL-MCNC: 0.4 MG/DL (ref 0.2–1)
BILIRUB UR QL: NEGATIVE
BUN SERPL-MCNC: 22 MG/DL (ref 6–20)
BUN/CREAT SERPL: 28 (ref 12–20)
CALCIUM SERPL-MCNC: 9 MG/DL (ref 8.5–10.1)
CALCULATED P AXIS, ECG09: 12 DEGREES
CALCULATED P AXIS, ECG09: 53 DEGREES
CALCULATED R AXIS, ECG10: -19 DEGREES
CALCULATED R AXIS, ECG10: -31 DEGREES
CALCULATED T AXIS, ECG11: -23 DEGREES
CALCULATED T AXIS, ECG11: 18 DEGREES
CHLORIDE SERPL-SCNC: 109 MMOL/L (ref 97–108)
CO2 SERPL-SCNC: 23 MMOL/L (ref 21–32)
COLOR UR: ABNORMAL
COMMENT, HOLDF: NORMAL
COVID-19 RAPID TEST, COVR: NOT DETECTED
CREAT SERPL-MCNC: 0.8 MG/DL (ref 0.55–1.02)
DIAGNOSIS, 93000: NORMAL
DIAGNOSIS, 93000: NORMAL
DIFFERENTIAL METHOD BLD: ABNORMAL
EOSINOPHIL # BLD: 0.1 K/UL (ref 0–0.4)
EOSINOPHIL NFR BLD: 1 % (ref 0–7)
EPITH CASTS URNS QL MICRO: ABNORMAL /LPF
ERYTHROCYTE [DISTWIDTH] IN BLOOD BY AUTOMATED COUNT: 13 % (ref 11.5–14.5)
GLOBULIN SER CALC-MCNC: 3.5 G/DL (ref 2–4)
GLUCOSE SERPL-MCNC: 121 MG/DL (ref 65–100)
GLUCOSE UR STRIP.AUTO-MCNC: NEGATIVE MG/DL
HCT VFR BLD AUTO: 38.3 % (ref 35–47)
HGB BLD-MCNC: 12.1 G/DL (ref 11.5–16)
HGB UR QL STRIP: NEGATIVE
HYALINE CASTS URNS QL MICRO: ABNORMAL /LPF (ref 0–5)
IMM GRANULOCYTES # BLD AUTO: 0.1 K/UL (ref 0–0.04)
IMM GRANULOCYTES NFR BLD AUTO: 1 % (ref 0–0.5)
INR PPP: 1.1 (ref 0.9–1.1)
KETONES UR QL STRIP.AUTO: NEGATIVE MG/DL
LEUKOCYTE ESTERASE UR QL STRIP.AUTO: ABNORMAL
LYMPHOCYTES # BLD: 0.9 K/UL (ref 0.8–3.5)
LYMPHOCYTES NFR BLD: 8 % (ref 12–49)
MCH RBC QN AUTO: 31.1 PG (ref 26–34)
MCHC RBC AUTO-ENTMCNC: 31.6 G/DL (ref 30–36.5)
MCV RBC AUTO: 98.5 FL (ref 80–99)
MONOCYTES # BLD: 0.8 K/UL (ref 0–1)
MONOCYTES NFR BLD: 7 % (ref 5–13)
NEUTS SEG # BLD: 9.8 K/UL (ref 1.8–8)
NEUTS SEG NFR BLD: 82 % (ref 32–75)
NITRITE UR QL STRIP.AUTO: NEGATIVE
NRBC # BLD: 0 K/UL (ref 0–0.01)
NRBC BLD-RTO: 0 PER 100 WBC
P-R INTERVAL, ECG05: 158 MS
P-R INTERVAL, ECG05: 160 MS
PH UR STRIP: 5.5 [PH] (ref 5–8)
PLATELET # BLD AUTO: 203 K/UL (ref 150–400)
PMV BLD AUTO: 11.3 FL (ref 8.9–12.9)
POTASSIUM SERPL-SCNC: 4 MMOL/L (ref 3.5–5.1)
PROT SERPL-MCNC: 7.2 G/DL (ref 6.4–8.2)
PROT UR STRIP-MCNC: NEGATIVE MG/DL
PROTHROMBIN TIME: 11.4 SEC (ref 9–11.1)
Q-T INTERVAL, ECG07: 396 MS
Q-T INTERVAL, ECG07: 422 MS
QRS DURATION, ECG06: 108 MS
QRS DURATION, ECG06: 98 MS
QTC CALCULATION (BEZET), ECG08: 421 MS
QTC CALCULATION (BEZET), ECG08: 448 MS
RBC # BLD AUTO: 3.89 M/UL (ref 3.8–5.2)
RBC #/AREA URNS HPF: ABNORMAL /HPF (ref 0–5)
SAMPLES BEING HELD,HOLD: NORMAL
SODIUM SERPL-SCNC: 136 MMOL/L (ref 136–145)
SOURCE, COVRS: NORMAL
SP GR UR REFRACTOMETRY: 1.02 (ref 1–1.03)
UA: UC IF INDICATED,UAUC: ABNORMAL
UROBILINOGEN UR QL STRIP.AUTO: 0.2 EU/DL (ref 0.2–1)
VENTRICULAR RATE, ECG03: 68 BPM
VENTRICULAR RATE, ECG03: 68 BPM
WBC # BLD AUTO: 11.8 K/UL (ref 3.6–11)
WBC URNS QL MICRO: ABNORMAL /HPF (ref 0–4)

## 2021-03-18 PROCEDURE — 99285 EMERGENCY DEPT VISIT HI MDM: CPT

## 2021-03-18 PROCEDURE — 86900 BLOOD TYPING SEROLOGIC ABO: CPT

## 2021-03-18 PROCEDURE — 86923 COMPATIBILITY TEST ELECTRIC: CPT

## 2021-03-18 PROCEDURE — 96375 TX/PRO/DX INJ NEW DRUG ADDON: CPT

## 2021-03-18 PROCEDURE — 81001 URINALYSIS AUTO W/SCOPE: CPT

## 2021-03-18 PROCEDURE — 71045 X-RAY EXAM CHEST 1 VIEW: CPT

## 2021-03-18 PROCEDURE — 36415 COLL VENOUS BLD VENIPUNCTURE: CPT

## 2021-03-18 PROCEDURE — 73552 X-RAY EXAM OF FEMUR 2/>: CPT

## 2021-03-18 PROCEDURE — 74011250636 HC RX REV CODE- 250/636: Performed by: EMERGENCY MEDICINE

## 2021-03-18 PROCEDURE — 93005 ELECTROCARDIOGRAM TRACING: CPT

## 2021-03-18 PROCEDURE — 96374 THER/PROPH/DIAG INJ IV PUSH: CPT

## 2021-03-18 PROCEDURE — 65270000029 HC RM PRIVATE

## 2021-03-18 PROCEDURE — 74011000250 HC RX REV CODE- 250: Performed by: PHYSICIAN ASSISTANT

## 2021-03-18 PROCEDURE — 85025 COMPLETE CBC W/AUTO DIFF WBC: CPT

## 2021-03-18 PROCEDURE — 80053 COMPREHEN METABOLIC PANEL: CPT

## 2021-03-18 PROCEDURE — 87086 URINE CULTURE/COLONY COUNT: CPT

## 2021-03-18 PROCEDURE — 74011000258 HC RX REV CODE- 258: Performed by: FAMILY MEDICINE

## 2021-03-18 PROCEDURE — 85610 PROTHROMBIN TIME: CPT

## 2021-03-18 PROCEDURE — 87635 SARS-COV-2 COVID-19 AMP PRB: CPT

## 2021-03-18 PROCEDURE — 51702 INSERT TEMP BLADDER CATH: CPT

## 2021-03-18 PROCEDURE — 77030005513 HC CATH URETH FOL11 MDII -B

## 2021-03-18 PROCEDURE — 74011250636 HC RX REV CODE- 250/636: Performed by: FAMILY MEDICINE

## 2021-03-18 PROCEDURE — 73502 X-RAY EXAM HIP UNI 2-3 VIEWS: CPT

## 2021-03-18 RX ORDER — MORPHINE SULFATE 2 MG/ML
1 INJECTION, SOLUTION INTRAMUSCULAR; INTRAVENOUS
Status: DISCONTINUED | OUTPATIENT
Start: 2021-03-18 | End: 2021-03-19

## 2021-03-18 RX ORDER — CARBIDOPA AND LEVODOPA 25; 100 MG/1; MG/1
1 TABLET ORAL 4 TIMES DAILY
Status: DISCONTINUED | OUTPATIENT
Start: 2021-03-18 | End: 2021-03-19

## 2021-03-18 RX ORDER — LIDOCAINE 4 G/100G
1 PATCH TOPICAL EVERY 24 HOURS
Status: DISCONTINUED | OUTPATIENT
Start: 2021-03-18 | End: 2021-03-24 | Stop reason: HOSPADM

## 2021-03-18 RX ORDER — SODIUM CHLORIDE 9 MG/ML
75 INJECTION, SOLUTION INTRAVENOUS CONTINUOUS
Status: DISCONTINUED | OUTPATIENT
Start: 2021-03-18 | End: 2021-03-19

## 2021-03-18 RX ORDER — SODIUM CHLORIDE 0.9 % (FLUSH) 0.9 %
5-40 SYRINGE (ML) INJECTION AS NEEDED
Status: DISCONTINUED | OUTPATIENT
Start: 2021-03-18 | End: 2021-03-19

## 2021-03-18 RX ORDER — NALOXONE HYDROCHLORIDE 0.4 MG/ML
0.4 INJECTION, SOLUTION INTRAMUSCULAR; INTRAVENOUS; SUBCUTANEOUS AS NEEDED
Status: DISCONTINUED | OUTPATIENT
Start: 2021-03-18 | End: 2021-03-19

## 2021-03-18 RX ORDER — MORPHINE SULFATE 4 MG/ML
4 INJECTION INTRAVENOUS
Status: COMPLETED | OUTPATIENT
Start: 2021-03-18 | End: 2021-03-18

## 2021-03-18 RX ORDER — SODIUM CHLORIDE 0.9 % (FLUSH) 0.9 %
5-40 SYRINGE (ML) INJECTION EVERY 8 HOURS
Status: DISCONTINUED | OUTPATIENT
Start: 2021-03-18 | End: 2021-03-19

## 2021-03-18 RX ORDER — ONDANSETRON 2 MG/ML
4 INJECTION INTRAMUSCULAR; INTRAVENOUS
Status: COMPLETED | OUTPATIENT
Start: 2021-03-18 | End: 2021-03-18

## 2021-03-18 RX ADMIN — Medication 10 ML: at 18:00

## 2021-03-18 RX ADMIN — MORPHINE SULFATE 1 MG: 2 INJECTION, SOLUTION INTRAMUSCULAR; INTRAVENOUS at 19:24

## 2021-03-18 RX ADMIN — ONDANSETRON 4 MG: 2 INJECTION INTRAMUSCULAR; INTRAVENOUS at 13:24

## 2021-03-18 RX ADMIN — SODIUM CHLORIDE 75 ML/HR: 9 INJECTION, SOLUTION INTRAVENOUS at 19:13

## 2021-03-18 RX ADMIN — CEFTRIAXONE SODIUM 1 G: 1 INJECTION, POWDER, FOR SOLUTION INTRAMUSCULAR; INTRAVENOUS at 19:13

## 2021-03-18 RX ADMIN — MORPHINE SULFATE 4 MG: 4 INJECTION INTRAVENOUS at 13:17

## 2021-03-18 RX ADMIN — Medication 10 ML: at 22:00

## 2021-03-18 NOTE — CONSULTS
ORTHO CONSULT NOTE    Date of Consultation:  2021  Referring Physician:  Teresa Arrieta MD  CC: L Hip Pain    HPI:  Alayna Velez is a 80 y.o. female who c/o L hip pain after falling; she was walking with her walker and fell; no concerning cp, sob, dizziness, ha, vision changes, numbness, tingling, focal weakness before fall; no other injuries reported; pt pain localized to L hip, worse with movement, dull ,,achy at rest; does not take blood thinners, last meal 0830. Past Medical History:   Diagnosis Date    Arthritis     Scoliosis       Past Surgical History:   Procedure Laterality Date    HX ORTHOPAEDIC        History reviewed. No pertinent family history. Social History     Tobacco Use    Smoking status: Former Smoker    Smokeless tobacco: Never Used   Substance Use Topics    Alcohol use: Yes     Alcohol/week: 7.0 standard drinks     Types: 7 Glasses of wine per week     Frequency: Never     Allergies   Allergen Reactions    Codeine Nausea Only    Erythromycin Nausea Only    Zithromax [Azithromycin] Nausea Only        Review of Systems:  Per HPI. Objective:     Patient Vitals for the past 8 hrs:   BP Temp Pulse Resp SpO2 Height Weight   21 1237 (!) 149/87 98.2 °F (36.8 °C) 71 18 95 % 5' 5\" (1.651 m) 45.4 kg (100 lb)     Temp (24hrs), Av.2 °F (36.8 °C), Min:98.2 °F (36.8 °C), Max:98.2 °F (36.8 °C)      EXAM:   NAD. Answers questions appropriately. Moves BUE spontaneously with NTTP long bones and joints. RLE no pain PROM, NTTP long bones and joints. Moves foot OK with SILT and CR toes < 2 secs. LLE shortened and externally rotated. Hip skin intact and soft compartments. Knee, ankle and foot NTTP. Moves foot OK with SILT and CR toes < 2 secs. Bilat calf soft and NTTP. Imaging Review:   Results from Hospital Encounter encounter on 21   XR FEMUR LT 2 V    Narrative EXAM: XR FEMUR LT 2 V    INDICATION: Left hip fracture.     COMPARISON: 3/18/2021    FINDINGS: Two views of the left femur redemonstrated acute comminuted  intertrochanteric left femur fracture. There is no acute distal femoral  fracture. The visualized portions of a left knee prosthesis demonstrate normal  alignment. Impression Acute left femoral intertrochanteric fracture. No acute distal  femoral fracture. Results from [place] encounter on 01/25/19   CT SPINE LUMB WO CONT    Narrative HISTORY: Trauma. Back pain. COMPARISON: None. TECHNIQUE:   Noncontrast axial CT imaging of the lumbar spine was performed. Coronal and sagittal reconstructions were obtained. CT dose reduction was  achieved through the use of a standardized protocol tailored for this  examination and automatic exposure control for dose modulation. FINDINGS:    There is T12 acute appearing 10% compression fracture without  retropulsion/spinal stenosis. No other fracture is seen. There is no vertebral  subluxation. There is dextroscoliosis. There is multilevel degenerative disc,  facet and interspinous disease. T12-L1:  The spinal canal and neural foramina are widely patent. L1-2:  The spinal canal and neural foramina are widely patent. L2-3:  The spinal canal and neural foramina are widely patent. L3-4:  There is moderate left foraminal stenosis on a degenerative basis. There  is minimal right foraminal and spinal canal encroachment. L4-5:  There is mild spinal canal and bilateral foraminal encroachment. L5-S1:  There is mild spinal canal and bilateral foraminal encroachment. Impression IMPRESSION:    1. T12 compression fracture. 2. Multilevel degenerative disease without significant spinal stenosis.          Labs:   Recent Results (from the past 24 hour(s))   EKG, 12 LEAD, INITIAL    Collection Time: 03/18/21  1:06 PM   Result Value Ref Range    Ventricular Rate 68 BPM    Atrial Rate 68 BPM    P-R Interval 160 ms    QRS Duration 108 ms    Q-T Interval 422 ms    QTC Calculation (Bezet) 448 ms Calculated P Axis 12 degrees    Calculated R Axis -31 degrees    Calculated T Axis 18 degrees    Diagnosis       Normal sinus rhythm  Left axis deviation  Nonspecific ST and T wave abnormality  No previous ECGs available     CBC WITH AUTOMATED DIFF    Collection Time: 03/18/21  1:18 PM   Result Value Ref Range    WBC 11.8 (H) 3.6 - 11.0 K/uL    RBC 3.89 3.80 - 5.20 M/uL    HGB 12.1 11.5 - 16.0 g/dL    HCT 38.3 35.0 - 47.0 %    MCV 98.5 80.0 - 99.0 FL    MCH 31.1 26.0 - 34.0 PG    MCHC 31.6 30.0 - 36.5 g/dL    RDW 13.0 11.5 - 14.5 %    PLATELET 253 187 - 027 K/uL    MPV 11.3 8.9 - 12.9 FL    NRBC 0.0 0  WBC    ABSOLUTE NRBC 0.00 0.00 - 0.01 K/uL    NEUTROPHILS 82 (H) 32 - 75 %    LYMPHOCYTES 8 (L) 12 - 49 %    MONOCYTES 7 5 - 13 %    EOSINOPHILS 1 0 - 7 %    BASOPHILS 1 0 - 1 %    IMMATURE GRANULOCYTES 1 (H) 0.0 - 0.5 %    ABS. NEUTROPHILS 9.8 (H) 1.8 - 8.0 K/UL    ABS. LYMPHOCYTES 0.9 0.8 - 3.5 K/UL    ABS. MONOCYTES 0.8 0.0 - 1.0 K/UL    ABS. EOSINOPHILS 0.1 0.0 - 0.4 K/UL    ABS. BASOPHILS 0.1 0.0 - 0.1 K/UL    ABS. IMM. GRANS. 0.1 (H) 0.00 - 0.04 K/UL    DF AUTOMATED     METABOLIC PANEL, COMPREHENSIVE    Collection Time: 03/18/21  1:18 PM   Result Value Ref Range    Sodium 136 136 - 145 mmol/L    Potassium 4.0 3.5 - 5.1 mmol/L    Chloride 109 (H) 97 - 108 mmol/L    CO2 23 21 - 32 mmol/L    Anion gap 4 (L) 5 - 15 mmol/L    Glucose 121 (H) 65 - 100 mg/dL    BUN 22 (H) 6 - 20 MG/DL    Creatinine 0.80 0.55 - 1.02 MG/DL    BUN/Creatinine ratio 28 (H) 12 - 20      GFR est AA >60 >60 ml/min/1.73m2    GFR est non-AA >60 >60 ml/min/1.73m2    Calcium 9.0 8.5 - 10.1 MG/DL    Bilirubin, total 0.4 0.2 - 1.0 MG/DL    ALT (SGPT) 24 12 - 78 U/L    AST (SGOT) 20 15 - 37 U/L    Alk.  phosphatase 122 (H) 45 - 117 U/L    Protein, total 7.2 6.4 - 8.2 g/dL    Albumin 3.7 3.5 - 5.0 g/dL    Globulin 3.5 2.0 - 4.0 g/dL    A-G Ratio 1.1 1.1 - 2.2     SAMPLES BEING HELD    Collection Time: 03/18/21  1:18 PM   Result Value Ref Range    SAMPLES BEING HELD 1RED     COMMENT        Add-on orders for these samples will be processed based on acceptable specimen integrity and analyte stability, which may vary by analyte. Impression:     Patient Active Problem List    Diagnosis Date Noted    Hip fracture ([facility] 03/18/2021     Active Problems:    Hip fracture ([facility] (3/18/2021)        Plan:   I explained the nature of the injury and discussed the recommended surgery. I discussed potential risks/benefits/alternatives of surgery and patient consents. Plan for Left Hip Intramedullary Nail on 3/18 by Dr. Sunny Johnson, timing TBD    Medical evaluation/clearance pending. Bedrest.  NPO now  Ice. SCDs OK. Hold pre-op anticoagulants. Dr. Sunny Johnson is aware and agrees with above plan. JUAN Salgado  Orthopedic Trauma Service  [facility]      ----    Due to OR unavailability this case has been pushed until 3/19 at 0900.    NPO at midnight, okay for single dose of Lovenox now

## 2021-03-18 NOTE — ED NOTES
Pt arrived via EMS from [facility] c/o fall with L hip pain today. Pt has hx of chronic neck pain.

## 2021-03-18 NOTE — ED NOTES
TRANSFER - OUT REPORT:    Verbal report given to Ashley Dhaliwal RN (name) on Carmie Nissen  being transferred to 5S,  (unit) for routine progression of care       Report consisted of patients Situation, Background, Assessment and   Recommendations(SBAR). Information from the following report(s) SBAR, ED Summary and MAR was reviewed with the receiving nurse. Lines:   Peripheral IV 03/18/21 Right Forearm (Active)   Site Assessment Clean, dry, & intact 03/18/21 1317   Phlebitis Assessment 0 03/18/21 1317   Infiltration Assessment 0 03/18/21 1317   Dressing Status Clean, dry, & intact 03/18/21 1317        Opportunity for questions and clarification was provided.       Patient transported with:   [employer]

## 2021-03-18 NOTE — ED PROVIDER NOTES
HPI       80y F here s/p fall. Was walking with a rollator that fell over, and she fell with it. Landed on the L hip. Didn't hit her head. No LOC. No pain aside from the L hip. Happened about an hour prior to arrival. Can't walk due to pain. Past Medical History:   Diagnosis Date    Arthritis     Scoliosis        Past Surgical History:   Procedure Laterality Date    HX ORTHOPAEDIC           History reviewed. No pertinent family history. Social History     Socioeconomic History    Marital status:      Spouse name: Not on file    Number of children: Not on file    Years of education: Not on file    Highest education level: Not on file   Occupational History    Not on file   Social Needs    Financial resource strain: Not on file    Food insecurity     Worry: Not on file     Inability: Not on file    Transportation needs     Medical: Not on file     Non-medical: Not on file   Tobacco Use    Smoking status: Former Smoker    Smokeless tobacco: Never Used   Substance and Sexual Activity    Alcohol use:  Yes     Alcohol/week: 7.0 standard drinks     Types: 7 Glasses of wine per week     Frequency: Never    Drug use: No    Sexual activity: Not on file   Lifestyle    Physical activity     Days per week: Not on file     Minutes per session: Not on file    Stress: Not on file   Relationships    Social connections     Talks on phone: Not on file     Gets together: Not on file     Attends Rastafari service: Not on file     Active member of club or organization: Not on file     Attends meetings of clubs or organizations: Not on file     Relationship status: Not on file    Intimate partner violence     Fear of current or ex partner: Not on file     Emotionally abused: Not on file     Physically abused: Not on file     Forced sexual activity: Not on file   Other Topics Concern    Not on file   Social History Narrative    ** Merged History Encounter **              ALLERGIES: Codeine, Erythromycin, and Zithromax [azithromycin]    Review of Systems   Review of Systems   Constitutional: (-) weight loss. HEENT: (-) stiff neck   Eyes: (-) discharge. Respiratory: (-) cough. Cardiovascular: (-) syncope. Gastrointestinal: (-) blood in stool. Genitourinary: (-) hematuria. Musculoskeletal: (-) myalgias. Neurological: (-) seizure. Skin: (-) petechiae  Lymph/Immunologic: (-) enlarged lymph nodes  All other systems reviewed and are negative. Vitals:    03/18/21 1237   BP: (!) 149/87   Pulse: 71   Resp: 18   Temp: 98.2 °F (36.8 °C)   SpO2: 95%   Weight: 45.4 kg (100 lb)   Height: 5' 5\" (1.651 m)            Physical Exam Nursing note and vitals reviewed. Constitutional: oriented to person, place, and time. appears well-developed and well-nourished. No distress. Head: Normocephalic and atraumatic. Sclera anicteric  Nose: No rhinorrhea  Mouth/Throat: Oropharynx is clear and moist. Pharynx normal  Eyes: Conjunctivae are normal. Pupils are equal, round, and reactive to light. Right eye exhibits no discharge. Left eye exhibits no discharge. Neck: Painless normal range of motion. Neck supple. No LAD. Cardiovascular: Normal rate, regular rhythm, normal heart sounds and intact distal pulses. Exam reveals no gallop and no friction rub. No murmur heard. Pulmonary/Chest:  No respiratory distress. No wheezes. No rales. No rhonchi. No increased work of breathing. No accessory muscle use. Good air exchange throughout. Abdominal: soft, non-tender, no rebound or guarding. No hepatosplenomegaly. Normal bowel sounds throughout. Back: no tenderness to palpation, no deformities, no CVA tenderness  Extremities/Musculoskeletal: L hip rotated and shortened. Distal extremities are neurovasc intact. Lymphadenopathy:   No adenopathy. Neurological:  Alert and oriented to person, place, and time. Coordination normal. CN 2-12 intact. Motor and sensory function intact. Skin: Skin is warm and dry. No rash noted.  No pallor. MDM 89y F here with likely hip fx. Plan for images and labs. Procedures    2:03 PM  Ortho seeing currently. Will admit. Perfect Serve Consult for Admission  2:03 PM    ED Room Number: ZF61/55  Patient Name and age:  Lew Jenkins 80 y.o.  female  Working Diagnosis:   1. Closed fracture of left hip, initial encounter ([facility]    2. Fall, initial encounter        COVID-19 Suspicion:  no  Sepsis present:  no  Reassessment needed: N/A  Code Status:  Full Code  Readmission: no  Isolation Requirements:  no  Recommended Level of Care:  med/surg  Department:[facility] Adult ED - 21   Other:  89y F with mechanical fall who has a hip fx. Ortho currently at the bedside. No other injuries.

## 2021-03-18 NOTE — H&P
History & Physical    Primary Care Provider: Maxim Javed MD  Source of Information: Patient     History of Presenting Illness:   Monalisa Montanez is a 80 y.o. female who presents with after a fall    History is primary obtained from the patient and her daughter was present at the bedside    Patient reports that she fell this morning. Patient reports that her Rollator fell and she went down with her. Patient reports she did not hit her head. Patient reports that immediately she started having pain in her left hip. Patient got concerned and decided to come to the hospital.  Patient is found to have a left hip fracture and was requested to be admitted to the hospital service. Patient denies any other complaints or problems. The patient denies any Headache, blurry vision, sore throat, trouble swallowing, trouble with speech, chest pain, SOB, cough, fever, chills, N/V/D, abd pain, urinary symptoms, constipation, recent travels, sick contacts, focal or generalized neurological symptoms,, injuries, rashes, contact with COVID-19 diagnosed patients, hematemesis, melena, hemoptysis, hematuria, rashes, denies starting any new medications and denies any other concerns or problems besides as mentioned above. Review of Systems:  Pertinent items are noted in the History of Present Illness. All other systems reviewed, pertinent positives and negatives noted in HPI    Past Medical History:   Diagnosis Date    Arthritis     Scoliosis       Past Surgical History:   Procedure Laterality Date    HX ORTHOPAEDIC       Prior to Admission medications    Medication Sig Start Date End Date Taking? Authorizing Provider   carbidopa-levodopa (SINEMET)  mg per tablet TAKE ONE TABLET BY MOUTH 3 TIMES A DAY 6/28/19   Giovani Duran MD   acetaminophen (TYLENOL) 325 mg tablet Take  by mouth every four (4) hours as needed for Pain.     Provider, Historical   cyanocobalamin (VITAMIN B12) 1,000 mcg/mL injection 1,000 mcg by IntraMUSCular route once. IM every 12 weeks    Provider, Historical   docusate sodium (COLACE) 100 mg capsule Take 100 mg by mouth two (2) times a day. Provider, Historical   dorzolamide-timolol (COSOPT) 22.3-6.8 mg/mL ophthalmic solution Administer 1 Drop to both eyes two (2) times a day. Provider, Historical   loperamide (IMODIUM) 2 mg capsule Take  by mouth. Provider, Historical   traMADol (ULTRAM) 50 mg tablet Take 50 mg by mouth every six (6) hours as needed for Pain. Provider, Historical   meloxicam (MOBIC) 7.5 mg tablet Take  by mouth daily. Provider, Historical   omeprazole (PRILOSEC) 20 mg capsule Take 20 mg by mouth daily. Provider, Historical   acetaminophen (TYLENOL EXTRA STRENGTH) 500 mg tablet Take 1,000 mg by mouth two (2) times a day. Other, MD Elizabeth   lidocaine (LIDODERM) 5 % Apply patch to the affected area for 12 hours a day and remove for 12 hours a day. 1/25/19   Aurora Smart,      Allergies   Allergen Reactions    Codeine Nausea Only    Erythromycin Nausea Only    Zithromax [Azithromycin] Nausea Only      History reviewed. No pertinent family history. Family history was discussed with the patient, all pertinent and relevant details are mentioned as above, no other pertinent and relevant family history was noted on my discussion with the patient.   Patient specifically denies any history of Gaucher disease in the family  SOCIAL HISTORY:  Patient resides:  Independently x   Assisted Living    SNF    With family care       Smoking history:   None    Former x   Chronic      Alcohol history:   None    Social x   Chronic      Ambulates:   Independently x   w/cane    w/walker    w/wc    CODE STATUS:  DNR    Full x   Other      Objective:     Physical Exam:     Visit Vitals  BP (!) 160/80 (BP 1 Location: Right upper arm, BP Patient Position: At rest)   Pulse 77   Temp 98.2 °F (36.8 °C)   Resp 18   Ht 5' 5\" (1.651 m)   Wt 45.4 kg (100 lb)   SpO2 90%   BMI 16.64 kg/m²      O2 Device: Room air    General : alert x 3, awake, moderately distressed, pleasant female  HEENT: PEERL, EOMI, moist mucus membrane, TM clear  Neck: supple, no JVD, no meningeal signs  Chest: Clear to auscultation bilaterally   CVS: S1 S2 heard, Capillary refill less than 2 seconds  Abd: soft/ Non tender, non distended, BS physiological,   Ext: no clubbing, no cyanosis, sniffing and tenderness left hip, left leg slightly shortened and externally rotated  Neuro/Psych: pleasant mood and affect, CN 2-12 grossly intact,   Skin: warm     EKG: Normal sinus rhythm with LAD    Data Review:     Recent Days:  Recent Labs     03/18/21  1318   WBC 11.8*   HGB 12.1   HCT 38.3        Recent Labs     03/18/21  1318      K 4.0   *   CO2 23   *   BUN 22*   CREA 0.80   CA 9.0   ALB 3.7   ALT 24     No results for input(s): PH, PCO2, PO2, HCO3, FIO2 in the last 72 hours.     24 Hour Results:  Recent Results (from the past 24 hour(s))   EKG, 12 LEAD, INITIAL    Collection Time: 03/18/21  1:06 PM   Result Value Ref Range    Ventricular Rate 68 BPM    Atrial Rate 68 BPM    P-R Interval 160 ms    QRS Duration 108 ms    Q-T Interval 422 ms    QTC Calculation (Bezet) 448 ms    Calculated P Axis 12 degrees    Calculated R Axis -31 degrees    Calculated T Axis 18 degrees    Diagnosis       Normal sinus rhythm  Left axis deviation  Nonspecific ST and T wave abnormality  No previous ECGs available     CBC WITH AUTOMATED DIFF    Collection Time: 03/18/21  1:18 PM   Result Value Ref Range    WBC 11.8 (H) 3.6 - 11.0 K/uL    RBC 3.89 3.80 - 5.20 M/uL    HGB 12.1 11.5 - 16.0 g/dL    HCT 38.3 35.0 - 47.0 %    MCV 98.5 80.0 - 99.0 FL    MCH 31.1 26.0 - 34.0 PG    MCHC 31.6 30.0 - 36.5 g/dL    RDW 13.0 11.5 - 14.5 %    PLATELET 174 050 - 381 K/uL    MPV 11.3 8.9 - 12.9 FL    NRBC 0.0 0  WBC    ABSOLUTE NRBC 0.00 0.00 - 0.01 K/uL    NEUTROPHILS 82 (H) 32 - 75 %    LYMPHOCYTES 8 (L) 12 - 49 %    MONOCYTES 7 5 - 13 %    EOSINOPHILS 1 0 - 7 %    BASOPHILS 1 0 - 1 %    IMMATURE GRANULOCYTES 1 (H) 0.0 - 0.5 %    ABS. NEUTROPHILS 9.8 (H) 1.8 - 8.0 K/UL    ABS. LYMPHOCYTES 0.9 0.8 - 3.5 K/UL    ABS. MONOCYTES 0.8 0.0 - 1.0 K/UL    ABS. EOSINOPHILS 0.1 0.0 - 0.4 K/UL    ABS. BASOPHILS 0.1 0.0 - 0.1 K/UL    ABS. IMM. GRANS. 0.1 (H) 0.00 - 0.04 K/UL    DF AUTOMATED     METABOLIC PANEL, COMPREHENSIVE    Collection Time: 03/18/21  1:18 PM   Result Value Ref Range    Sodium 136 136 - 145 mmol/L    Potassium 4.0 3.5 - 5.1 mmol/L    Chloride 109 (H) 97 - 108 mmol/L    CO2 23 21 - 32 mmol/L    Anion gap 4 (L) 5 - 15 mmol/L    Glucose 121 (H) 65 - 100 mg/dL    BUN 22 (H) 6 - 20 MG/DL    Creatinine 0.80 0.55 - 1.02 MG/DL    BUN/Creatinine ratio 28 (H) 12 - 20      GFR est AA >60 >60 ml/min/1.73m2    GFR est non-AA >60 >60 ml/min/1.73m2    Calcium 9.0 8.5 - 10.1 MG/DL    Bilirubin, total 0.4 0.2 - 1.0 MG/DL    ALT (SGPT) 24 12 - 78 U/L    AST (SGOT) 20 15 - 37 U/L    Alk. phosphatase 122 (H) 45 - 117 U/L    Protein, total 7.2 6.4 - 8.2 g/dL    Albumin 3.7 3.5 - 5.0 g/dL    Globulin 3.5 2.0 - 4.0 g/dL    A-G Ratio 1.1 1.1 - 2.2     SAMPLES BEING HELD    Collection Time: 03/18/21  1:18 PM   Result Value Ref Range    SAMPLES BEING HELD 1RED     COMMENT        Add-on orders for these samples will be processed based on acceptable specimen integrity and analyte stability, which may vary by analyte.    TYPE & SCREEN    Collection Time: 03/18/21  1:18 PM   Result Value Ref Range    Crossmatch Expiration 03/21/2021,2359     ABO/Rh(D) O POSITIVE     Antibody screen NEG    URINALYSIS W/ REFLEX CULTURE    Collection Time: 03/18/21  2:58 PM    Specimen: Urine   Result Value Ref Range    Color YELLOW/STRAW      Appearance CLEAR CLEAR      Specific gravity 1.017 1.003 - 1.030      pH (UA) 5.5 5.0 - 8.0      Protein Negative NEG mg/dL    Glucose Negative NEG mg/dL    Ketone Negative NEG mg/dL    Bilirubin Negative NEG Blood Negative NEG      Urobilinogen 0.2 0.2 - 1.0 EU/dL    Nitrites Negative NEG      Leukocyte Esterase MODERATE (A) NEG      UA:UC IF INDICATED URINE CULTURE ORDERED (A) CNI      WBC 20-50 0 - 4 /hpf    RBC 0-5 0 - 5 /hpf    Epithelial cells MODERATE (A) FEW /lpf    Bacteria Negative NEG /hpf    Hyaline cast 2-5 0 - 5 /lpf   COVID-19 RAPID TEST    Collection Time: 03/18/21  3:00 PM   Result Value Ref Range    Specimen source Nasopharyngeal      COVID-19 rapid test Not detected NOTD           Imaging:   Xr Chest Sngl V    Result Date: 3/18/2021  No acute process. Xr Hip Lt W Or Wo Pelv 2-3 Vws    Result Date: 3/18/2021  Acute comminuted left femoral intertrochanteric fracture extending into the proximal femoral diaphysis. Xr Femur Lt 2 V    Result Date: 3/18/2021  Acute left femoral intertrochanteric fracture. No acute distal femoral fracture. Assessment/Plan     Left femoral fracture: Patient will be admitted on telemetry bed, patient for surgical intervention later today, patient is high risk for surgery based on risk stratification criteria, provide IV hydration, pain control, bedrest, Goodson catheter, supportive care and close monitoring, further intervention per hospitalist, reassess as needed    UTI: Start patient on broad-spectrum IV antibiotics, IV fluids, supportive care, close monitoring, reassess as needed    Dehydration: Start patient on gentle IV hydration, repeat labs in the morning, continue monitor      GI DVT prophylaxis: Patient with SCDs             Please note that this dictation was completed with Ketsu, the [employer] voice recognition software. Quite often unanticipated grammatical, syntax, homophones, and other interpretive errors are inadvertently transcribed by the computer software. Please disregard these errors. Please excuse any errors that have escaped final proofreading.          Signed By: Renetta Bonilla MD     March 18, 2021

## 2021-03-19 ENCOUNTER — APPOINTMENT (OUTPATIENT)
Dept: GENERAL RADIOLOGY | Age: 86
DRG: 481 | End: 2021-03-19
Attending: ORTHOPAEDIC SURGERY
Payer: MEDICARE

## 2021-03-19 ENCOUNTER — ANESTHESIA EVENT (OUTPATIENT)
Dept: SURGERY | Age: 86
DRG: 481 | End: 2021-03-19
Payer: MEDICARE

## 2021-03-19 ENCOUNTER — ANESTHESIA (OUTPATIENT)
Dept: SURGERY | Age: 86
DRG: 481 | End: 2021-03-19
Payer: MEDICARE

## 2021-03-19 LAB
ALBUMIN SERPL-MCNC: 3.1 G/DL (ref 3.5–5)
ALBUMIN/GLOB SERPL: 1 {RATIO} (ref 1.1–2.2)
ALP SERPL-CCNC: 96 U/L (ref 45–117)
ALT SERPL-CCNC: 18 U/L (ref 12–78)
ANION GAP SERPL CALC-SCNC: 4 MMOL/L (ref 5–15)
AST SERPL-CCNC: 11 U/L (ref 15–37)
BACTERIA SPEC CULT: NORMAL
BASOPHILS # BLD: 0 K/UL (ref 0–0.1)
BASOPHILS NFR BLD: 0 % (ref 0–1)
BILIRUB SERPL-MCNC: 0.5 MG/DL (ref 0.2–1)
BUN SERPL-MCNC: 18 MG/DL (ref 6–20)
BUN/CREAT SERPL: 32 (ref 12–20)
CALCIUM SERPL-MCNC: 8.6 MG/DL (ref 8.5–10.1)
CHLORIDE SERPL-SCNC: 111 MMOL/L (ref 97–108)
CO2 SERPL-SCNC: 26 MMOL/L (ref 21–32)
CREAT SERPL-MCNC: 0.56 MG/DL (ref 0.55–1.02)
DIFFERENTIAL METHOD BLD: ABNORMAL
EOSINOPHIL # BLD: 0.1 K/UL (ref 0–0.4)
EOSINOPHIL NFR BLD: 1 % (ref 0–7)
ERYTHROCYTE [DISTWIDTH] IN BLOOD BY AUTOMATED COUNT: 13 % (ref 11.5–14.5)
GLOBULIN SER CALC-MCNC: 3 G/DL (ref 2–4)
GLUCOSE SERPL-MCNC: 106 MG/DL (ref 65–100)
HCT VFR BLD AUTO: 32.1 % (ref 35–47)
HGB BLD-MCNC: 10.1 G/DL (ref 11.5–16)
IMM GRANULOCYTES # BLD AUTO: 0.1 K/UL (ref 0–0.04)
IMM GRANULOCYTES NFR BLD AUTO: 1 % (ref 0–0.5)
LYMPHOCYTES # BLD: 0.7 K/UL (ref 0.8–3.5)
LYMPHOCYTES NFR BLD: 8 % (ref 12–49)
MCH RBC QN AUTO: 31.2 PG (ref 26–34)
MCHC RBC AUTO-ENTMCNC: 31.5 G/DL (ref 30–36.5)
MCV RBC AUTO: 99.1 FL (ref 80–99)
MONOCYTES # BLD: 0.9 K/UL (ref 0–1)
MONOCYTES NFR BLD: 10 % (ref 5–13)
NEUTS SEG # BLD: 7.3 K/UL (ref 1.8–8)
NEUTS SEG NFR BLD: 80 % (ref 32–75)
NRBC # BLD: 0 K/UL (ref 0–0.01)
NRBC BLD-RTO: 0 PER 100 WBC
PLATELET # BLD AUTO: 213 K/UL (ref 150–400)
PMV BLD AUTO: 10.7 FL (ref 8.9–12.9)
POTASSIUM SERPL-SCNC: 4 MMOL/L (ref 3.5–5.1)
PROT SERPL-MCNC: 6.1 G/DL (ref 6.4–8.2)
RBC # BLD AUTO: 3.24 M/UL (ref 3.8–5.2)
RBC MORPH BLD: ABNORMAL
RBC MORPH BLD: ABNORMAL
SERVICE CMNT-IMP: NORMAL
SODIUM SERPL-SCNC: 141 MMOL/L (ref 136–145)
WBC # BLD AUTO: 9.1 K/UL (ref 3.6–11)

## 2021-03-19 PROCEDURE — 77030031139 HC SUT VCRL2 J&J -A: Performed by: ORTHOPAEDIC SURGERY

## 2021-03-19 PROCEDURE — 85025 COMPLETE CBC W/AUTO DIFF WBC: CPT

## 2021-03-19 PROCEDURE — 0QS706Z REPOSITION LEFT UPPER FEMUR WITH INTRAMEDULLARY INTERNAL FIXATION DEVICE, OPEN APPROACH: ICD-10-PCS | Performed by: ORTHOPAEDIC SURGERY

## 2021-03-19 PROCEDURE — 76060000033 HC ANESTHESIA 1 TO 1.5 HR: Performed by: ORTHOPAEDIC SURGERY

## 2021-03-19 PROCEDURE — 74011000258 HC RX REV CODE- 258: Performed by: ORTHOPAEDIC SURGERY

## 2021-03-19 PROCEDURE — 76010000149 HC OR TIME 1 TO 1.5 HR: Performed by: ORTHOPAEDIC SURGERY

## 2021-03-19 PROCEDURE — 74011000250 HC RX REV CODE- 250

## 2021-03-19 PROCEDURE — 2709999900 HC NON-CHARGEABLE SUPPLY

## 2021-03-19 PROCEDURE — 74011250636 HC RX REV CODE- 250/636: Performed by: ORTHOPAEDIC SURGERY

## 2021-03-19 PROCEDURE — 65270000029 HC RM PRIVATE

## 2021-03-19 PROCEDURE — 74011000250 HC RX REV CODE- 250: Performed by: ANESTHESIOLOGY

## 2021-03-19 PROCEDURE — C1713 ANCHOR/SCREW BN/BN,TIS/BN: HCPCS | Performed by: ORTHOPAEDIC SURGERY

## 2021-03-19 PROCEDURE — 77030016453 HC BIT DRL CALIB1 ZIMM -C: Performed by: ORTHOPAEDIC SURGERY

## 2021-03-19 PROCEDURE — 77030036660

## 2021-03-19 PROCEDURE — 74011000250 HC RX REV CODE- 250: Performed by: NURSE ANESTHETIST, CERTIFIED REGISTERED

## 2021-03-19 PROCEDURE — 76210000000 HC OR PH I REC 2 TO 2.5 HR: Performed by: ORTHOPAEDIC SURGERY

## 2021-03-19 PROCEDURE — 2709999900 HC NON-CHARGEABLE SUPPLY: Performed by: ORTHOPAEDIC SURGERY

## 2021-03-19 PROCEDURE — 74011250637 HC RX REV CODE- 250/637: Performed by: ANESTHESIOLOGY

## 2021-03-19 PROCEDURE — 74011000250 HC RX REV CODE- 250: Performed by: ORTHOPAEDIC SURGERY

## 2021-03-19 PROCEDURE — 74011250636 HC RX REV CODE- 250/636

## 2021-03-19 PROCEDURE — 80053 COMPREHEN METABOLIC PANEL: CPT

## 2021-03-19 PROCEDURE — 77030008462 HC STPLR SKN PROX J&J -A: Performed by: ORTHOPAEDIC SURGERY

## 2021-03-19 PROCEDURE — 74011250636 HC RX REV CODE- 250/636: Performed by: FAMILY MEDICINE

## 2021-03-19 PROCEDURE — C1769 GUIDE WIRE: HCPCS | Performed by: ORTHOPAEDIC SURGERY

## 2021-03-19 PROCEDURE — 74011250636 HC RX REV CODE- 250/636: Performed by: NURSE ANESTHETIST, CERTIFIED REGISTERED

## 2021-03-19 PROCEDURE — 74011250637 HC RX REV CODE- 250/637: Performed by: FAMILY MEDICINE

## 2021-03-19 PROCEDURE — 77030003671 HC NDL SPN HAVL -B: Performed by: ANESTHESIOLOGY

## 2021-03-19 PROCEDURE — 36415 COLL VENOUS BLD VENIPUNCTURE: CPT

## 2021-03-19 PROCEDURE — 73501 X-RAY EXAM HIP UNI 1 VIEW: CPT

## 2021-03-19 PROCEDURE — 72170 X-RAY EXAM OF PELVIS: CPT

## 2021-03-19 PROCEDURE — 74011000250 HC RX REV CODE- 250: Performed by: FAMILY MEDICINE

## 2021-03-19 PROCEDURE — P9045 ALBUMIN (HUMAN), 5%, 250 ML: HCPCS | Performed by: NURSE ANESTHETIST, CERTIFIED REGISTERED

## 2021-03-19 PROCEDURE — 77030020788: Performed by: ORTHOPAEDIC SURGERY

## 2021-03-19 DEVICE — IMPLANTABLE DEVICE
Type: IMPLANTABLE DEVICE | Site: HIP | Status: FUNCTIONAL
Brand: PTN PERITROCHANTERIC NAIL

## 2021-03-19 DEVICE — IMPLANTABLE DEVICE
Type: IMPLANTABLE DEVICE | Site: HIP | Status: FUNCTIONAL
Brand: PHOENIX NAIL SYSTEM

## 2021-03-19 RX ORDER — GUAIFENESIN 100 MG/5ML
200 SOLUTION ORAL
COMMUNITY

## 2021-03-19 RX ORDER — CHOLECALCIFEROL (VITAMIN D3) 125 MCG
2000 CAPSULE ORAL DAILY
COMMUNITY

## 2021-03-19 RX ORDER — FACIAL-BODY WIPES
10 EACH TOPICAL DAILY PRN
Status: DISCONTINUED | OUTPATIENT
Start: 2021-03-21 | End: 2021-03-24 | Stop reason: HOSPADM

## 2021-03-19 RX ORDER — PROPOFOL 10 MG/ML
INJECTION, EMULSION INTRAVENOUS AS NEEDED
Status: DISCONTINUED | OUTPATIENT
Start: 2021-03-19 | End: 2021-03-19 | Stop reason: HOSPADM

## 2021-03-19 RX ORDER — ONDANSETRON 2 MG/ML
4 INJECTION INTRAMUSCULAR; INTRAVENOUS
Status: DISCONTINUED | OUTPATIENT
Start: 2021-03-19 | End: 2021-03-24 | Stop reason: HOSPADM

## 2021-03-19 RX ORDER — LORAZEPAM 0.5 MG/1
0.25 TABLET ORAL 3 TIMES DAILY
Status: DISCONTINUED | OUTPATIENT
Start: 2021-03-19 | End: 2021-03-20

## 2021-03-19 RX ORDER — ALBUMIN HUMAN 50 G/1000ML
SOLUTION INTRAVENOUS AS NEEDED
Status: DISCONTINUED | OUTPATIENT
Start: 2021-03-19 | End: 2021-03-19 | Stop reason: HOSPADM

## 2021-03-19 RX ORDER — SODIUM CHLORIDE 9 MG/ML
125 INJECTION, SOLUTION INTRAVENOUS CONTINUOUS
Status: DISPENSED | OUTPATIENT
Start: 2021-03-19 | End: 2021-03-20

## 2021-03-19 RX ORDER — BUPIVACAINE HYDROCHLORIDE 5 MG/ML
INJECTION, SOLUTION EPIDURAL; INTRACAUDAL
Status: COMPLETED | OUTPATIENT
Start: 2021-03-19 | End: 2021-03-19

## 2021-03-19 RX ORDER — DIPHENHYDRAMINE HYDROCHLORIDE 50 MG/ML
12.5 INJECTION, SOLUTION INTRAMUSCULAR; INTRAVENOUS
Status: ACTIVE | OUTPATIENT
Start: 2021-03-19 | End: 2021-03-20

## 2021-03-19 RX ORDER — SERTRALINE HYDROCHLORIDE 50 MG/1
50 TABLET, FILM COATED ORAL
COMMUNITY

## 2021-03-19 RX ORDER — EPHEDRINE SULFATE/0.9% NACL/PF 50 MG/5 ML
SYRINGE (ML) INTRAVENOUS
Status: COMPLETED
Start: 2021-03-19 | End: 2021-03-19

## 2021-03-19 RX ORDER — TRAMADOL HYDROCHLORIDE 50 MG/1
50 TABLET ORAL
Status: DISCONTINUED | OUTPATIENT
Start: 2021-03-19 | End: 2021-03-20

## 2021-03-19 RX ORDER — POLYETHYLENE GLYCOL 3350 17 G/17G
17 POWDER, FOR SOLUTION ORAL DAILY
Status: DISCONTINUED | OUTPATIENT
Start: 2021-03-20 | End: 2021-03-24 | Stop reason: HOSPADM

## 2021-03-19 RX ORDER — LATANOPROST 50 UG/ML
1 SOLUTION/ DROPS OPHTHALMIC
COMMUNITY

## 2021-03-19 RX ORDER — SERTRALINE HYDROCHLORIDE 50 MG/1
50 TABLET, FILM COATED ORAL
Status: DISCONTINUED | OUTPATIENT
Start: 2021-03-19 | End: 2021-03-24 | Stop reason: HOSPADM

## 2021-03-19 RX ORDER — METHYL SALICYLATE/MENTH/CAMPH 15-10-3.1%
GEL (GRAM) TOPICAL 3 TIMES DAILY
COMMUNITY

## 2021-03-19 RX ORDER — AMOXICILLIN 250 MG
1 CAPSULE ORAL 2 TIMES DAILY
Status: DISCONTINUED | OUTPATIENT
Start: 2021-03-19 | End: 2021-03-24 | Stop reason: HOSPADM

## 2021-03-19 RX ORDER — SODIUM CHLORIDE 0.9 % (FLUSH) 0.9 %
5-40 SYRINGE (ML) INJECTION AS NEEDED
Status: DISCONTINUED | OUTPATIENT
Start: 2021-03-19 | End: 2021-03-24 | Stop reason: HOSPADM

## 2021-03-19 RX ORDER — PROPOFOL 10 MG/ML
INJECTION, EMULSION INTRAVENOUS
Status: DISCONTINUED | OUTPATIENT
Start: 2021-03-19 | End: 2021-03-19 | Stop reason: HOSPADM

## 2021-03-19 RX ORDER — LORAZEPAM 0.5 MG/1
0.25 TABLET ORAL 3 TIMES DAILY
COMMUNITY
End: 2021-03-24

## 2021-03-19 RX ORDER — ONDANSETRON 2 MG/ML
4 INJECTION INTRAMUSCULAR; INTRAVENOUS ONCE
Status: COMPLETED | OUTPATIENT
Start: 2021-03-19 | End: 2021-03-19

## 2021-03-19 RX ORDER — EPHEDRINE SULFATE/0.9% NACL/PF 50 MG/5 ML
SYRINGE (ML) INTRAVENOUS
Status: DISPENSED
Start: 2021-03-19 | End: 2021-03-20

## 2021-03-19 RX ORDER — IPRATROPIUM BROMIDE AND ALBUTEROL SULFATE 2.5; .5 MG/3ML; MG/3ML
3 SOLUTION RESPIRATORY (INHALATION)
COMMUNITY

## 2021-03-19 RX ORDER — TIMOLOL MALEATE 5 MG/ML
1 SOLUTION/ DROPS OPHTHALMIC 2 TIMES DAILY
Status: DISCONTINUED | OUTPATIENT
Start: 2021-03-19 | End: 2021-03-24 | Stop reason: HOSPADM

## 2021-03-19 RX ORDER — DORZOLAMIDE HCL 20 MG/ML
1 SOLUTION/ DROPS OPHTHALMIC 3 TIMES DAILY
Status: DISCONTINUED | OUTPATIENT
Start: 2021-03-19 | End: 2021-03-24 | Stop reason: HOSPADM

## 2021-03-19 RX ORDER — FERROUS SULFATE, DRIED 160(50) MG
1 TABLET, EXTENDED RELEASE ORAL
Status: DISCONTINUED | OUTPATIENT
Start: 2021-03-20 | End: 2021-03-24 | Stop reason: HOSPADM

## 2021-03-19 RX ORDER — ONDANSETRON 2 MG/ML
INJECTION INTRAMUSCULAR; INTRAVENOUS
Status: COMPLETED
Start: 2021-03-19 | End: 2021-03-19

## 2021-03-19 RX ORDER — SODIUM CHLORIDE, SODIUM LACTATE, POTASSIUM CHLORIDE, CALCIUM CHLORIDE 600; 310; 30; 20 MG/100ML; MG/100ML; MG/100ML; MG/100ML
INJECTION, SOLUTION INTRAVENOUS
Status: DISCONTINUED | OUTPATIENT
Start: 2021-03-19 | End: 2021-03-19 | Stop reason: HOSPADM

## 2021-03-19 RX ORDER — EPHEDRINE SULFATE/0.9% NACL/PF 50 MG/5 ML
15 SYRINGE (ML) INTRAVENOUS ONCE
Status: COMPLETED | OUTPATIENT
Start: 2021-03-19 | End: 2021-03-19

## 2021-03-19 RX ORDER — MORPHINE SULFATE 2 MG/ML
1 INJECTION, SOLUTION INTRAMUSCULAR; INTRAVENOUS
Status: DISCONTINUED | OUTPATIENT
Start: 2021-03-19 | End: 2021-03-20

## 2021-03-19 RX ORDER — ONDANSETRON 4 MG/1
4 TABLET, ORALLY DISINTEGRATING ORAL
COMMUNITY

## 2021-03-19 RX ORDER — NALOXONE HYDROCHLORIDE 0.4 MG/ML
0.4 INJECTION, SOLUTION INTRAMUSCULAR; INTRAVENOUS; SUBCUTANEOUS AS NEEDED
Status: DISCONTINUED | OUTPATIENT
Start: 2021-03-19 | End: 2021-03-24 | Stop reason: HOSPADM

## 2021-03-19 RX ORDER — DIPHENHYDRAMINE HCL 25 MG
1 CAPSULE ORAL
COMMUNITY

## 2021-03-19 RX ORDER — KETAMINE HYDROCHLORIDE 10 MG/ML
INJECTION, SOLUTION INTRAMUSCULAR; INTRAVENOUS AS NEEDED
Status: DISCONTINUED | OUTPATIENT
Start: 2021-03-19 | End: 2021-03-19 | Stop reason: HOSPADM

## 2021-03-19 RX ORDER — EPHEDRINE SULFATE/0.9% NACL/PF 50 MG/5 ML
10 SYRINGE (ML) INTRAVENOUS ONCE
Status: COMPLETED | OUTPATIENT
Start: 2021-03-19 | End: 2021-03-19

## 2021-03-19 RX ORDER — OXYCODONE HYDROCHLORIDE 5 MG/1
5 TABLET ORAL
Status: DISCONTINUED | OUTPATIENT
Start: 2021-03-19 | End: 2021-03-20

## 2021-03-19 RX ORDER — ACETAMINOPHEN 325 MG/1
650 TABLET ORAL EVERY 6 HOURS
Status: DISCONTINUED | OUTPATIENT
Start: 2021-03-19 | End: 2021-03-24 | Stop reason: HOSPADM

## 2021-03-19 RX ORDER — SODIUM CHLORIDE 0.9 % (FLUSH) 0.9 %
5-40 SYRINGE (ML) INJECTION EVERY 8 HOURS
Status: DISCONTINUED | OUTPATIENT
Start: 2021-03-19 | End: 2021-03-24 | Stop reason: HOSPADM

## 2021-03-19 RX ORDER — LATANOPROST 50 UG/ML
1 SOLUTION/ DROPS OPHTHALMIC
Status: DISCONTINUED | OUTPATIENT
Start: 2021-03-19 | End: 2021-03-24 | Stop reason: HOSPADM

## 2021-03-19 RX ORDER — ONDANSETRON 2 MG/ML
INJECTION INTRAMUSCULAR; INTRAVENOUS AS NEEDED
Status: DISCONTINUED | OUTPATIENT
Start: 2021-03-19 | End: 2021-03-19 | Stop reason: HOSPADM

## 2021-03-19 RX ORDER — DEXAMETHASONE SODIUM PHOSPHATE 4 MG/ML
INJECTION, SOLUTION INTRA-ARTICULAR; INTRALESIONAL; INTRAMUSCULAR; INTRAVENOUS; SOFT TISSUE AS NEEDED
Status: DISCONTINUED | OUTPATIENT
Start: 2021-03-19 | End: 2021-03-19 | Stop reason: HOSPADM

## 2021-03-19 RX ORDER — ENOXAPARIN SODIUM 100 MG/ML
40 INJECTION SUBCUTANEOUS DAILY
Status: DISCONTINUED | OUTPATIENT
Start: 2021-03-20 | End: 2021-03-24 | Stop reason: HOSPADM

## 2021-03-19 RX ORDER — CARBIDOPA AND LEVODOPA 25; 100 MG/1; MG/1
1 TABLET ORAL 3 TIMES DAILY
Status: DISCONTINUED | OUTPATIENT
Start: 2021-03-19 | End: 2021-03-24 | Stop reason: HOSPADM

## 2021-03-19 RX ADMIN — SODIUM CHLORIDE, POTASSIUM CHLORIDE, SODIUM LACTATE AND CALCIUM CHLORIDE: 600; 310; 30; 20 INJECTION, SOLUTION INTRAVENOUS at 11:18

## 2021-03-19 RX ADMIN — BUPIVACAINE HYDROCHLORIDE 10 MG: 5 INJECTION, SOLUTION EPIDURAL; INTRACAUDAL; PERINEURAL at 10:19

## 2021-03-19 RX ADMIN — CEFTRIAXONE SODIUM 1 G: 1 INJECTION, POWDER, FOR SOLUTION INTRAMUSCULAR; INTRAVENOUS at 18:26

## 2021-03-19 RX ADMIN — DORZOLAMIDE HYDROCHLORIDE 1 DROP: 20 SOLUTION/ DROPS OPHTHALMIC at 18:22

## 2021-03-19 RX ADMIN — ONDANSETRON HYDROCHLORIDE 4 MG: 2 INJECTION, SOLUTION INTRAMUSCULAR; INTRAVENOUS at 11:03

## 2021-03-19 RX ADMIN — Medication 15 MG: at 12:59

## 2021-03-19 RX ADMIN — Medication 30 ML: at 18:19

## 2021-03-19 RX ADMIN — SODIUM CHLORIDE 125 ML/HR: 9 INJECTION, SOLUTION INTRAVENOUS at 12:07

## 2021-03-19 RX ADMIN — PROPOFOL 20 MG: 10 INJECTION, EMULSION INTRAVENOUS at 10:21

## 2021-03-19 RX ADMIN — ONDANSETRON 4 MG: 2 INJECTION INTRAMUSCULAR; INTRAVENOUS at 13:11

## 2021-03-19 RX ADMIN — DEXAMETHASONE SODIUM PHOSPHATE 8 MG: 4 INJECTION, SOLUTION INTRAMUSCULAR; INTRAVENOUS at 10:38

## 2021-03-19 RX ADMIN — PROPOFOL 30 MG: 10 INJECTION, EMULSION INTRAVENOUS at 10:22

## 2021-03-19 RX ADMIN — MORPHINE SULFATE 1 MG: 2 INJECTION, SOLUTION INTRAMUSCULAR; INTRAVENOUS at 18:20

## 2021-03-19 RX ADMIN — PHENYLEPHRINE HYDROCHLORIDE 40 MCG/MIN: 10 INJECTION INTRAVENOUS at 10:13

## 2021-03-19 RX ADMIN — LATANOPROST 1 DROP: 50 SOLUTION OPHTHALMIC at 22:43

## 2021-03-19 RX ADMIN — CEFAZOLIN SODIUM 2 G: 1 INJECTION, POWDER, FOR SOLUTION INTRAMUSCULAR; INTRAVENOUS at 17:55

## 2021-03-19 RX ADMIN — KETAMINE HYDROCHLORIDE 10 MG: 10 INJECTION, SOLUTION INTRAMUSCULAR; INTRAVENOUS at 10:11

## 2021-03-19 RX ADMIN — SODIUM CHLORIDE, POTASSIUM CHLORIDE, SODIUM LACTATE AND CALCIUM CHLORIDE: 600; 310; 30; 20 INJECTION, SOLUTION INTRAVENOUS at 10:04

## 2021-03-19 RX ADMIN — Medication 10 ML: at 06:00

## 2021-03-19 RX ADMIN — SODIUM CHLORIDE 125 ML/HR: 9 INJECTION, SOLUTION INTRAVENOUS at 17:00

## 2021-03-19 RX ADMIN — PROPOFOL 30 MG: 10 INJECTION, EMULSION INTRAVENOUS at 10:11

## 2021-03-19 RX ADMIN — TIMOLOL MALEATE 1 DROP: 5 SOLUTION OPHTHALMIC at 18:23

## 2021-03-19 RX ADMIN — Medication 10 MG: at 13:27

## 2021-03-19 RX ADMIN — CARBIDOPA AND LEVODOPA 1 TABLET: 25; 100 TABLET ORAL at 22:35

## 2021-03-19 RX ADMIN — PROPOFOL 20 MG: 10 INJECTION, EMULSION INTRAVENOUS at 10:12

## 2021-03-19 RX ADMIN — ALBUMIN (HUMAN) 250 ML: 12.5 INJECTION, SOLUTION INTRAVENOUS at 11:15

## 2021-03-19 RX ADMIN — PROPOFOL 50 MCG/KG/MIN: 10 INJECTION, EMULSION INTRAVENOUS at 10:23

## 2021-03-19 RX ADMIN — SERTRALINE 50 MG: 50 TABLET, FILM COATED ORAL at 22:34

## 2021-03-19 RX ADMIN — PROPOFOL 30 MG: 10 INJECTION, EMULSION INTRAVENOUS at 10:39

## 2021-03-19 RX ADMIN — ACETAMINOPHEN ORAL SOLUTION 650 MG: 650 SOLUTION ORAL at 09:15

## 2021-03-19 RX ADMIN — MORPHINE SULFATE 1 MG: 2 INJECTION, SOLUTION INTRAMUSCULAR; INTRAVENOUS at 02:28

## 2021-03-19 NOTE — PROGRESS NOTES
Hospitalist Progress Note          Brittany Wilson NP  Please call  and page for questions. Call physician on-call through the  7pm-7am    Daily Progress Note: 3/19/2021    Primary care provider:Gwen Herrmann MD    Date of admission: 3/18/2021 12:32 PM    Admission Summary:     From H&P 3/18/2021 12:32 PM:  Venkat Hurst is a 80 y.o. female who presents with after a fall  History is primary obtained from the patient and her daughter was present at the bedside  Patient reports that she fell this morning. Patient reports that her Rollator fell and she went down with her. Patient reports she did not hit her head. Patient reports that immediately she started having pain in her left hip. Patient got concerned and decided to come to the hospital.  Patient is found to have a left hip fracture and was requested to be admitted to the hospital service. Patient denies any other complaints or problems. The patient denies any Headache, blurry vision, sore throat, trouble swallowing, trouble with speech, chest pain, SOB, cough, fever, chills, N/V/D, abd pain, urinary symptoms, constipation, recent travels, sick contacts, focal or generalized neurological symptoms,, injuries, rashes, contact with COVID-19 diagnosed patients, hematemesis, melena, hemoptysis, hematuria, rashes, denies starting any new medications and denies any other concerns or problems besides as mentioned above. \"    Hospital Course:     03/19: Left IM hip nailing    Subjective:   F/U on L hip fracture    Pt seen today after surgery alert and in NAD. She is oriented to person/place and situation but some confusion about time and forgetting she is at the hospital and not at home. Per daughter, she has become increasingly confused since COVID. She denies pain to left hip unless moved.  Daughter reports patient has low pain tolerance and tends to respond dramatically initially but does well with redirection and PRN pain medications. Discussed hypotension in PACU and ABL from fracture that sometimes requires blood transfusion. Informed consent for blood obtained and witnessed by nurse. Assessment/Plan:       Left femoral fracture: s/p IM nailing ()  -Resolved  -Pain control  -Monitor Hgb/BP  -PT/OT per ortho  -Appreciate ortho input    Abnormal UA: UTI ruled out  -Urine culture w/ no growth  -stop ceftriaxone    Dehydration  -Resolved w/ IVF  -Encourage PO intake    ABL Anemia  -Hgb POA 12.1-->10.1 this AM  -Hypotensive in PACU  -Monitor Hgb, transfuse for less than 7 or if hgb continues to drop and pt becomes symptomatic  -Monitor HH    Parkinsons: w/ intermittent confusion  -Stable, per daughter confusion started w/ COVID isolation and has increased some in last few months  -cont sinimet  -supportive care  -fall precuations  -judicial use of opiates    DVTppx: SCDs, lovenox  Gippx: NA  Code Status: Full code  Diet: Regular  Activity: OOB to chair TID and PRN  Discharge: Plan to discharge home w/ HH vs IPR pending progress; hopefully next 1-2 days          Review of Systems:     Full ROS complete with pertinent positives and negatives as per HPI, otherwise negative    Objective:   Physical Exam:     Visit Vitals  /79 (BP 1 Location: Left upper arm, BP Patient Position: At rest;Supine)   Pulse 97   Temp 97.3 °F (36.3 °C)   Resp 16   Ht 5' 5\" (1.651 m)   Wt 45.4 kg (100 lb)   SpO2 99%   BMI 16.64 kg/m²    O2 Flow Rate (L/min): 2 l/min O2 Device: Room air    Temp (24hrs), Av.6 °F (36.4 °C), Min:97 °F (36.1 °C), Max:98.2 °F (36.8 °C)    No intake/output data recorded.  0701 -  1900  In:  [I.V.:]  Out: 1150 [Urine:1100]      General:  Alert, cooperative, no distress, appears stated age. Lungs:   Clear to auscultation bilaterally. Heart:  Regular rate and rhythm, S1, S2 normal, no murmur, click, rub or gallop. Abdomen:   Soft, non-tender, non-distended.  Bowel sounds normal. Extremities: Extremities normal, atraumatic, no cyanosis or edema. Left hip dressing c/d/i   Skin: Skin color, texture, turgor normal. No rashes or lesions   Neurologic: CNII-XII intact. Equal Strength. ANGULO. A&Ox2-3 w/ intermittent confusion     Data Review:       Recent Days:  Recent Labs     03/19/21 0229 03/18/21  1318   WBC 9.1 11.8*   HGB 10.1* 12.1   HCT 32.1* 38.3    203     Recent Labs     03/19/21 0229 03/18/21  1801 03/18/21  1318     --  136   K 4.0  --  4.0   *  --  109*   CO2 26  --  23   *  --  121*   BUN 18  --  22*   CREA 0.56  --  0.80   CA 8.6  --  9.0   ALB 3.1*  --  3.7   ALT 18  --  24   INR  --  1.1  --      No results for input(s): PH, PCO2, PO2, HCO3, FIO2 in the last 72 hours. 24 Hour Results:  Recent Results (from the past 24 hour(s))   METABOLIC PANEL, COMPREHENSIVE    Collection Time: 03/19/21  2:29 AM   Result Value Ref Range    Sodium 141 136 - 145 mmol/L    Potassium 4.0 3.5 - 5.1 mmol/L    Chloride 111 (H) 97 - 108 mmol/L    CO2 26 21 - 32 mmol/L    Anion gap 4 (L) 5 - 15 mmol/L    Glucose 106 (H) 65 - 100 mg/dL    BUN 18 6 - 20 MG/DL    Creatinine 0.56 0.55 - 1.02 MG/DL    BUN/Creatinine ratio 32 (H) 12 - 20      GFR est AA >60 >60 ml/min/1.73m2    GFR est non-AA >60 >60 ml/min/1.73m2    Calcium 8.6 8.5 - 10.1 MG/DL    Bilirubin, total 0.5 0.2 - 1.0 MG/DL    ALT (SGPT) 18 12 - 78 U/L    AST (SGOT) 11 (L) 15 - 37 U/L    Alk.  phosphatase 96 45 - 117 U/L    Protein, total 6.1 (L) 6.4 - 8.2 g/dL    Albumin 3.1 (L) 3.5 - 5.0 g/dL    Globulin 3.0 2.0 - 4.0 g/dL    A-G Ratio 1.0 (L) 1.1 - 2.2     CBC WITH AUTOMATED DIFF    Collection Time: 03/19/21  2:29 AM   Result Value Ref Range    WBC 9.1 3.6 - 11.0 K/uL    RBC 3.24 (L) 3.80 - 5.20 M/uL    HGB 10.1 (L) 11.5 - 16.0 g/dL    HCT 32.1 (L) 35.0 - 47.0 %    MCV 99.1 (H) 80.0 - 99.0 FL    MCH 31.2 26.0 - 34.0 PG    MCHC 31.5 30.0 - 36.5 g/dL    RDW 13.0 11.5 - 14.5 %    PLATELET 768 589 - 514 K/uL    MPV 10.7 8.9 - 12.9 FL    NRBC 0.0 0  WBC    ABSOLUTE NRBC 0.00 0.00 - 0.01 K/uL    NEUTROPHILS 80 (H) 32 - 75 %    LYMPHOCYTES 8 (L) 12 - 49 %    MONOCYTES 10 5 - 13 %    EOSINOPHILS 1 0 - 7 %    BASOPHILS 0 0 - 1 %    IMMATURE GRANULOCYTES 1 (H) 0.0 - 0.5 %    ABS. NEUTROPHILS 7.3 1.8 - 8.0 K/UL    ABS. LYMPHOCYTES 0.7 (L) 0.8 - 3.5 K/UL    ABS. MONOCYTES 0.9 0.0 - 1.0 K/UL    ABS. EOSINOPHILS 0.1 0.0 - 0.4 K/UL    ABS. BASOPHILS 0.0 0.0 - 0.1 K/UL    ABS. IMM.  GRANS. 0.1 (H) 0.00 - 0.04 K/UL    DF SMEAR SCANNED      RBC COMMENTS MACROCYTOSIS  1+        RBC COMMENTS OVALOCYTES  1+           Problem List:  Problem List as of 3/19/2021 Never Reviewed          Codes Class Noted - Resolved    Hip fracture [facility]) ICD-10-CM: E87.001W  ICD-9-CM: 820.8  3/18/2021 - Present              Medications reviewed  Current Facility-Administered Medications   Medication Dose Route Frequency    dorzolamide (TRUSOPT) 2 % ophthalmic solution 1 Drop  1 Drop Both Eyes TID    And    timolol (TIMOPTIC) 0.5 % ophthalmic solution 1 Drop  1 Drop Both Eyes BID    traMADoL (ULTRAM) tablet 50 mg  50 mg Oral Q6H PRN    0.9% sodium chloride infusion  125 mL/hr IntraVENous CONTINUOUS    sodium chloride (NS) flush 5-40 mL  5-40 mL IntraVENous Q8H    sodium chloride (NS) flush 5-40 mL  5-40 mL IntraVENous PRN    naloxone (NARCAN) injection 0.4 mg  0.4 mg IntraVENous PRN    [START ON 3/20/2021] calcium-vitamin D (OS-KOSTAS +D3) 500 mg-200 unit per tablet 1 Tab  1 Tab Oral TID WITH MEALS    senna-docusate (PERICOLACE) 8.6-50 mg per tablet 1 Tab  1 Tab Oral BID    [START ON 3/20/2021] polyethylene glycol (MIRALAX) packet 17 g  17 g Oral DAILY    [START ON 3/21/2021] bisacodyL (DULCOLAX) suppository 10 mg  10 mg Rectal DAILY PRN    acetaminophen (TYLENOL) tablet 650 mg  650 mg Oral Q6H    oxyCODONE IR (ROXICODONE) tablet 5 mg  5 mg Oral Q4H PRN    morphine injection 1 mg  1 mg IntraVENous Q4H PRN    diphenhydrAMINE (BENADRYL) injection 12.5 mg 12.5 mg IntraVENous Q6H PRN    [START ON 3/20/2021] enoxaparin (LOVENOX) injection 40 mg  40 mg SubCUTAneous DAILY    carbidopa-levodopa (SINEMET)  mg per tablet 1 Tab  1 Tab Oral TID    sertraline (ZOLOFT) tablet 50 mg  50 mg Oral QHS    LORazepam (ATIVAN) tablet 0.25 mg  0.25 mg Oral TID    latanoprost (XALATAN) 0.005 % ophthalmic solution 1 Drop  1 Drop Both Eyes QHS    lidocaine 4 % patch 1 Patch  1 Patch TransDERmal Q24H    ceFAZolin (ANCEF) 2 g in sterile water (preservative free) 20 mL IV syringe  2 g IntraVENous Q8H    cefTRIAXone (ROCEPHIN) 1 g in 0.9% sodium chloride (MBP/ADV) 50 mL MBP  1 g IntraVENous Q24H       Care Plan discussed with: Patient/family, nurse      Ryder Agee, NP  Hospitalist  Providers can reach me on [employer]

## 2021-03-19 NOTE — PERIOP NOTES
TRANSFER - OUT REPORT:    Verbal report given to Johnny Warren (name) on Sigmund Norman  being transferred to [id number] (unit) for ordered procedure       Report consisted of patients Situation, Background, Assessment and   Recommendations(SBAR). Time Pre op antibiotic given:on floor  Anesthesia Stop time: 4198  Goodson Present on Transfer to floor:y  Order for Goodson on Chart:y  Discharge Prescriptions with Chart:n    Information from the following report(s) SBAR, OR Summary, Intake/Output, MAR and Accordion was reviewed with the receiving nurse. Opportunity for questions and clarification was provided. Is the patient on 02? YES       L/Min 2         Other     Is the patient on a monitor? NO    Is the nurse transporting with the patient? NO    Surgical Waiting Area notified of patient's transfer from PACU? YES      The following personal items collected during your admission accompanied patient upon transfer:   Dental Appliance: Dental Appliances: None  Vision:    Hearing Aid:    Jewelry: Jewelry: Ring, With patient  Clothing: Clothing: (inpatient)  Other Valuables:    Valuables sent to safe:       All belongings on floor

## 2021-03-19 NOTE — PROGRESS NOTES
Bedside and Verbal shift change report given to [address] [place] [address] (oncoming nurse) by Adelaida Miles RN (offgoing nurse). Report included the following information SBAR, Kardex and MAR.

## 2021-03-19 NOTE — PROGRESS NOTES
Occupational Therapy  03/19/21    Orders received, chart review completed. Note patient POD #0 s/p L femur IM nailing. OT will follow up tomorrow for evaluation. Recommend OOB to chair three times a day for meals, self-completion of ADLs as able and medically stable.      Thank you,   Elvis Arriola, OTD, OTR/L

## 2021-03-19 NOTE — CONSULTS
CONSULT NOTE    Subjective:     Date of Consultation:  March 19, 2021  Referring Physician:  Micha Phillips    Asked to see [address] by Mary Kay Martines. Shahram Deng is a 80 y.o. female who is being seen for left hip. Complaint began on day of admission workup has revealed a displaced intertrochanteric fracture of the Left Hip. Patient's ambulatory status includes occasional use of an assist device and their home environment is assisted living. Pt. Denies head trauma/LOC during injury. Patient Active Problem List    Diagnosis Date Noted    Hip fracture ([address] 03/18/2021     History reviewed. No pertinent family history. Social History     Tobacco Use    Smoking status: Former Smoker    Smokeless tobacco: Never Used   Substance Use Topics    Alcohol use: Yes     Alcohol/week: 7.0 standard drinks     Types: 7 Glasses of wine per week     Frequency: Never     Past Medical History:   Diagnosis Date    Arthritis     Scoliosis       Past Surgical History:   Procedure Laterality Date    HX ORTHOPAEDIC        Prior to Admission medications    Medication Sig Start Date End Date Taking? Authorizing Provider   acetaminophen (TYLENOL) 325 mg tablet Take  by mouth every four (4) hours as needed for Pain. Yes Provider, Historical   cyanocobalamin (VITAMIN B12) 1,000 mcg/mL injection 1,000 mcg by IntraMUSCular route once. IM every 12 weeks   Yes Provider, Historical   dorzolamide-timolol (COSOPT) 22.3-6.8 mg/mL ophthalmic solution Administer 1 Drop to both eyes two (2) times a day. Yes Provider, Historical   meloxicam (MOBIC) 7.5 mg tablet Take  by mouth daily. Yes Provider, Historical   omeprazole (PRILOSEC) 20 mg capsule Take 20 mg by mouth daily. Yes Provider, Historical   lidocaine (LIDODERM) 5 % Apply patch to the affected area for 12 hours a day and remove for 12 hours a day.  1/25/19  Yes Vida Armstrong, DO   carbidopa-levodopa (SINEMET)  mg per tablet TAKE ONE TABLET BY MOUTH 3 TIMES A DAY 19   Kelvin Knutson MD   docusate sodium (COLACE) 100 mg capsule Take 100 mg by mouth two (2) times a day. Provider, Historical   loperamide (IMODIUM) 2 mg capsule Take  by mouth. Provider, Historical   traMADol (ULTRAM) 50 mg tablet Take 50 mg by mouth every six (6) hours as needed for Pain. Provider, Historical   acetaminophen (TYLENOL EXTRA STRENGTH) 500 mg tablet Take 1,000 mg by mouth two (2) times a day. Other, MD Elizabeth     Current Facility-Administered Medications   Medication Dose Route Frequency    dorzolamide (TRUSOPT) 2 % ophthalmic solution 1 Drop  1 Drop Both Eyes TID    And    timolol (TIMOPTIC) 0.5 % ophthalmic solution 1 Drop  1 Drop Both Eyes BID    lidocaine 4 % patch 1 Patch  1 Patch TransDERmal Q24H    ceFAZolin (ANCEF) 2 g in sterile water (preservative free) 20 mL IV syringe  2 g IntraVENous Q8H    carbidopa-levodopa (SINEMET)  mg per tablet 1 Tab  1 Tab Oral QID    0.9% sodium chloride infusion  75 mL/hr IntraVENous CONTINUOUS    sodium chloride (NS) flush 5-40 mL  5-40 mL IntraVENous Q8H    sodium chloride (NS) flush 5-40 mL  5-40 mL IntraVENous PRN    naloxone (NARCAN) injection 0.4 mg  0.4 mg IntraVENous PRN    morphine injection 1 mg  1 mg IntraVENous Q4H PRN    cefTRIAXone (ROCEPHIN) 1 g in 0.9% sodium chloride (MBP/ADV) 50 mL MBP  1 g IntraVENous Q24H      Allergies   Allergen Reactions    Codeine Nausea Only    Erythromycin Nausea Only    Zithromax [Azithromycin] Nausea Only        Review of Systems:  A comprehensive review of systems was negative except for that written in the HPI.     Mental Status: no dementia    Objective:     Patient Vitals for the past 8 hrs:   BP Temp Pulse Resp SpO2   21 0846 120/63  69 15 94 %   21 0334 94/61 97.9 °F (36.6 °C) 68 14 90 %     Temp (24hrs), Av.1 °F (36.7 °C), Min:97.9 °F (36.6 °C), Max:98.2 °F (36.8 °C)      Physical Exam:  General appearance: alert, cooperative, no distress, appears stated age  Lungs: clear to auscultation bilaterally  Heart: regular rate and rhythm  Abdomen: soft, non-tender. Bowel sounds normal. No masses,  no organomegaly  Extremities: extremities normal, atraumatic, no cyanosis or edema. Left hip is shortened and externally rotated. Pulses: 2+ and symmetric  Neurologic: Grossly normal      Imaging Review: moderately displaced intertrochanteric fracture    Labs:   Recent Results (from the past 24 hour(s))   EKG, 12 LEAD, INITIAL    Collection Time: 03/18/21  1:06 PM   Result Value Ref Range    Ventricular Rate 68 BPM    Atrial Rate 68 BPM    P-R Interval 160 ms    QRS Duration 108 ms    Q-T Interval 422 ms    QTC Calculation (Bezet) 448 ms    Calculated P Axis 12 degrees    Calculated R Axis -31 degrees    Calculated T Axis 18 degrees    Diagnosis       Normal sinus rhythm  Left axis deviation  Nonspecific ST and T wave abnormality  No previous ECGs available  Confirmed by Ashwin Fontana MD, Jacinda Webb ([id number]) on 3/18/2021 9:13:06 PM     CBC WITH AUTOMATED DIFF    Collection Time: 03/18/21  1:18 PM   Result Value Ref Range    WBC 11.8 (H) 3.6 - 11.0 K/uL    RBC 3.89 3.80 - 5.20 M/uL    HGB 12.1 11.5 - 16.0 g/dL    HCT 38.3 35.0 - 47.0 %    MCV 98.5 80.0 - 99.0 FL    MCH 31.1 26.0 - 34.0 PG    MCHC 31.6 30.0 - 36.5 g/dL    RDW 13.0 11.5 - 14.5 %    PLATELET 069 068 - 791 K/uL    MPV 11.3 8.9 - 12.9 FL    NRBC 0.0 0  WBC    ABSOLUTE NRBC 0.00 0.00 - 0.01 K/uL    NEUTROPHILS 82 (H) 32 - 75 %    LYMPHOCYTES 8 (L) 12 - 49 %    MONOCYTES 7 5 - 13 %    EOSINOPHILS 1 0 - 7 %    BASOPHILS 1 0 - 1 %    IMMATURE GRANULOCYTES 1 (H) 0.0 - 0.5 %    ABS. NEUTROPHILS 9.8 (H) 1.8 - 8.0 K/UL    ABS. LYMPHOCYTES 0.9 0.8 - 3.5 K/UL    ABS. MONOCYTES 0.8 0.0 - 1.0 K/UL    ABS. EOSINOPHILS 0.1 0.0 - 0.4 K/UL    ABS. BASOPHILS 0.1 0.0 - 0.1 K/UL    ABS. IMM.  GRANS. 0.1 (H) 0.00 - 0.04 K/UL    DF AUTOMATED     METABOLIC PANEL, COMPREHENSIVE    Collection Time: 03/18/21  1:18 PM   Result Value Ref Range    Sodium 136 136 - 145 mmol/L    Potassium 4.0 3.5 - 5.1 mmol/L    Chloride 109 (H) 97 - 108 mmol/L    CO2 23 21 - 32 mmol/L    Anion gap 4 (L) 5 - 15 mmol/L    Glucose 121 (H) 65 - 100 mg/dL    BUN 22 (H) 6 - 20 MG/DL    Creatinine 0.80 0.55 - 1.02 MG/DL    BUN/Creatinine ratio 28 (H) 12 - 20      GFR est AA >60 >60 ml/min/1.73m2    GFR est non-AA >60 >60 ml/min/1.73m2    Calcium 9.0 8.5 - 10.1 MG/DL    Bilirubin, total 0.4 0.2 - 1.0 MG/DL    ALT (SGPT) 24 12 - 78 U/L    AST (SGOT) 20 15 - 37 U/L    Alk. phosphatase 122 (H) 45 - 117 U/L    Protein, total 7.2 6.4 - 8.2 g/dL    Albumin 3.7 3.5 - 5.0 g/dL    Globulin 3.5 2.0 - 4.0 g/dL    A-G Ratio 1.1 1.1 - 2.2     SAMPLES BEING HELD    Collection Time: 03/18/21  1:18 PM   Result Value Ref Range    SAMPLES BEING HELD 1RED     COMMENT        Add-on orders for these samples will be processed based on acceptable specimen integrity and analyte stability, which may vary by analyte.    TYPE & SCREEN    Collection Time: 03/18/21  1:18 PM   Result Value Ref Range    Crossmatch Expiration 03/21/2021,2359     ABO/Rh(D) O POSITIVE     Antibody screen NEG    URINALYSIS W/ REFLEX CULTURE    Collection Time: 03/18/21  2:58 PM    Specimen: Urine   Result Value Ref Range    Color YELLOW/STRAW      Appearance CLEAR CLEAR      Specific gravity 1.017 1.003 - 1.030      pH (UA) 5.5 5.0 - 8.0      Protein Negative NEG mg/dL    Glucose Negative NEG mg/dL    Ketone Negative NEG mg/dL    Bilirubin Negative NEG      Blood Negative NEG      Urobilinogen 0.2 0.2 - 1.0 EU/dL    Nitrites Negative NEG      Leukocyte Esterase MODERATE (A) NEG      UA:UC IF INDICATED URINE CULTURE ORDERED (A) CNI      WBC 20-50 0 - 4 /hpf    RBC 0-5 0 - 5 /hpf    Epithelial cells MODERATE (A) FEW /lpf    Bacteria Negative NEG /hpf    Hyaline cast 2-5 0 - 5 /lpf   COVID-19 RAPID TEST    Collection Time: 03/18/21  3:00 PM   Result Value Ref Range    Specimen source Nasopharyngeal      COVID-19 rapid test Not detected NOTD     EKG, 12 LEAD, INITIAL    Collection Time: 03/18/21  5:34 PM   Result Value Ref Range    Ventricular Rate 68 BPM    Atrial Rate 68 BPM    P-R Interval 158 ms    QRS Duration 98 ms    Q-T Interval 396 ms    QTC Calculation (Bezet) 421 ms    Calculated P Axis 53 degrees    Calculated R Axis -19 degrees    Calculated T Axis -23 degrees    Diagnosis       Normal sinus rhythm  ST & T wave abnormality, consider anterior ischemia  Abnormal ECG  When compared with ECG of 18-MAR-2021 13:06,  T wave inversion more evident in Inferior leads  Confirmed by MD Hogue Stephen ([id number]) on 3/18/2021 9:13:07 PM     PROTHROMBIN TIME + INR    Collection Time: 03/18/21  6:01 PM   Result Value Ref Range    INR 1.1 0.9 - 1.1      Prothrombin time 11.4 (H) 9.0 - 11.1 sec   METABOLIC PANEL, COMPREHENSIVE    Collection Time: 03/19/21  2:29 AM   Result Value Ref Range    Sodium 141 136 - 145 mmol/L    Potassium 4.0 3.5 - 5.1 mmol/L    Chloride 111 (H) 97 - 108 mmol/L    CO2 26 21 - 32 mmol/L    Anion gap 4 (L) 5 - 15 mmol/L    Glucose 106 (H) 65 - 100 mg/dL    BUN 18 6 - 20 MG/DL    Creatinine 0.56 0.55 - 1.02 MG/DL    BUN/Creatinine ratio 32 (H) 12 - 20      GFR est AA >60 >60 ml/min/1.73m2    GFR est non-AA >60 >60 ml/min/1.73m2    Calcium 8.6 8.5 - 10.1 MG/DL    Bilirubin, total 0.5 0.2 - 1.0 MG/DL    ALT (SGPT) 18 12 - 78 U/L    AST (SGOT) 11 (L) 15 - 37 U/L    Alk. phosphatase 96 45 - 117 U/L    Protein, total 6.1 (L) 6.4 - 8.2 g/dL    Albumin 3.1 (L) 3.5 - 5.0 g/dL    Globulin 3.0 2.0 - 4.0 g/dL    A-G Ratio 1.0 (L) 1.1 - 2.2     CBC WITH AUTOMATED DIFF    Collection Time: 03/19/21  2:29 AM   Result Value Ref Range    WBC 9.1 3.6 - 11.0 K/uL    RBC 3.24 (L) 3.80 - 5.20 M/uL    HGB 10.1 (L) 11.5 - 16.0 g/dL    HCT 32.1 (L) 35.0 - 47.0 %    MCV 99.1 (H) 80.0 - 99.0 FL    MCH 31.2 26.0 - 34.0 PG    MCHC 31.5 30.0 - 36.5 g/dL    RDW 13.0 11.5 - 14.5 %    PLATELET 213 150 - 400 K/uL    MPV 10.7 8.9 - 12.9 FL    NRBC 0.0 0   WBC    ABSOLUTE NRBC 0.00 0.00 - 0.01 K/uL    NEUTROPHILS 80 (H) 32 - 75 %    LYMPHOCYTES 8 (L) 12 - 49 %    MONOCYTES 10 5 - 13 %    EOSINOPHILS 1 0 - 7 %    BASOPHILS 0 0 - 1 %    IMMATURE GRANULOCYTES 1 (H) 0.0 - 0.5 %    ABS. NEUTROPHILS 7.3 1.8 - 8.0 K/UL    ABS. LYMPHOCYTES 0.7 (L) 0.8 - 3.5 K/UL    ABS. MONOCYTES 0.9 0.0 - 1.0 K/UL    ABS. EOSINOPHILS 0.1 0.0 - 0.4 K/UL    ABS. BASOPHILS 0.0 0.0 - 0.1 K/UL    ABS. IMM. GRANS. 0.1 (H) 0.00 - 0.04 K/UL    DF SMEAR SCANNED      RBC COMMENTS MACROCYTOSIS  1+        RBC COMMENTS OVALOCYTES  1+             Impression:     Patient Active Problem List    Diagnosis Date Noted    [facility] 03/18/2021     Active Problems:    Hip fracture ([facility] (3/18/2021)        Plan:   Pt. Has a displaced intertrochanteric fracture of the left hip. Discussed with patient and Family and want to proceed with ORIF.   Will do CARLOS ENRIQUE Figueroa([place]) Kimberly Chacon MD

## 2021-03-19 NOTE — ANESTHESIA PREPROCEDURE EVALUATION
Relevant Problems   No relevant active problems       Anesthetic History   No history of anesthetic complications            Review of Systems / Medical History  Patient summary reviewed, nursing notes reviewed and pertinent labs reviewed    Pulmonary  Within defined limits                 Neuro/Psych             Comments: Parkinsons Cardiovascular                  Exercise tolerance: >4 METS     GI/Hepatic/Renal  Within defined limits              Endo/Other        Arthritis     Other Findings              Physical Exam    Airway  Mallampati: II  TM Distance: > 6 cm  Neck ROM: normal range of motion   Mouth opening: Normal     Cardiovascular  Regular rate and rhythm,  S1 and S2 normal,  no murmur, click, rub, or gallop             Dental  No notable dental hx       Pulmonary  Breath sounds clear to auscultation               Abdominal  GI exam deferred       Other Findings            Anesthetic Plan    ASA: 3  Anesthesia type: spinal          Induction: Intravenous  Anesthetic plan and risks discussed with: Patient

## 2021-03-19 NOTE — ANESTHESIA PROCEDURE NOTES
Spinal Block    Start time: 3/19/2021 10:10 AM  End time: 3/19/2021 10:19 AM  Performed by: Julio Harris CRNA  Authorized by: Keo Andrade MD     Pre-procedure: Indications: primary anesthetic  Preanesthetic Checklist: patient identified, risks and benefits discussed, anesthesia consent, site marked, patient being monitored and timeout performed    Timeout Time: 10:09          Spinal Block:   Patient Position:  Right lateral decubitus  Prep Region:  Lumbar  Prep: DuraPrep      Location:  L2-3  Technique:  Single shot        Needle:   Needle Type:  Pencan  Needle Gauge:  24 G  Attempts:  1      Events: CSF confirmed, no blood with aspiration and no paresthesia        Assessment:  Insertion:  Uncomplicated  Patient tolerance:  Patient tolerated the procedure well with no immediate complications  Patient sedated with ketamine and propofol before positioning. Patient comfortable during spinal placement.

## 2021-03-19 NOTE — PROGRESS NOTES
Admission Medication Reconciliation:    Information obtained from:  Current medication list from [address] [place] [address]'[facility] [place]:  NO    Comments/Recommendations: Patient presenting from [facility] where patient currently resides. Utilized current medication list/MAR report to update PTA meds/review patient's allergies. Medication changes (since last review): Added  - Sertraline  - Lorazepam  - Artificial tears  - Latanoprost eye drops  - Guaifenesin  - Duo-nebs  - Ondansetron  - Vitamin D3  - Salonpas gel    Adjusted  - Cyanocobalamin (from q12 weeks to q2 weeks)  - Docusate (from BID to daily PRN)  - Loperamide (to 2 mg QID prn)    Removed  - Lidocaine patch  - Acetaminophen PRN (takes BID scheduled)  - Tramadol     ¹[employer] pharmacy benefit data reflects medications filled and processed through the patient's insurance, however   this data does NOT capture whether the medication was picked up or is currently being taken by the patient. Allergies:  Codeine, Erythromycin, and Zithromax [azithromycin]    Significant PMH/Disease States:   Past Medical History:   Diagnosis Date    Arthritis     Scoliosis      Chief Complaint for this Admission:    Chief Complaint   Patient presents with    Fall     Prior to Admission Medications:   Prior to Admission Medications   Prescriptions Last Dose Informant Taking? LORazepam (ATIVAN) 0.5 mg tablet 3/17/2021  Yes   Sig: Take 0.25 mg by mouth three (3) times daily. acetaminophen (TYLENOL EXTRA STRENGTH) 500 mg tablet 3/17/2021 at Unknown time  Yes   Sig: Take 1,000 mg by mouth two (2) times a day. albuterol-ipratropium (DUO-NEB) 2.5 mg-0.5 mg/3 ml nebu   Yes   Sig: 3 mL by Nebulization route every four (4) hours as needed for Wheezing. camphor-methyl salicyl-menthoL (Salonpas Deep Relieving) 3.1-15-10 % gel 3/17/2021  Yes   Sig: by Apply Externally route three (3) times daily.    carbidopa-levodopa (SINEMET)  mg per tablet 3/17/2021 Yes   Sig: TAKE ONE TABLET BY MOUTH 3 TIMES A DAY   cholecalciferol, vitamin D3, (Vitamin D3) 50 mcg (2,000 unit) tab 3/17/2021  Yes   Sig: Take 2,000 Units by mouth daily. cyanocobalamin (VITAMIN B12) 1,000 mcg/mL injection 3/12/2021 at Unknown time  Yes   Si,000 mcg by IntraMUSCular route Once every 2 weeks. dextran 70-hypromellose (Artificial Tears,[id number],) ophthalmic solution   Yes   Sig: Administer 1 Drop to both eyes every two (2) hours as needed for Other (Dry eyes). docusate sodium (COLACE) 100 mg capsule   Yes   Sig: Take 100 mg by mouth daily as needed. dorzolamide-timolol (COSOPT) 22.3-6.8 mg/mL ophthalmic solution 3/17/2021 at Unknown time  Yes   Sig: Administer 1 Drop to both eyes two (2) times a day. guaiFENesin (ROBITUSSIN) 100 mg/5 mL liquid   Yes   Sig: Take 200 mg by mouth every four (4) hours as needed for Cough. latanoprost (XALATAN) 0.005 % ophthalmic solution 3/17/2021  Yes   Sig: Administer 1 Drop to both eyes nightly. loperamide (IMODIUM) 2 mg capsule   Yes   Sig: Take 2 mg by mouth four (4) times daily as needed for Diarrhea.   meloxicam (MOBIC) 7.5 mg tablet 3/17/2021  Yes   Sig: Take 7.5 mg by mouth daily. omeprazole (PRILOSEC) 20 mg capsule 3/17/2021 at Unknown time  Yes   Sig: Take 20 mg by mouth daily. ondansetron (Zofran ODT) 4 mg disintegrating tablet   Yes   Sig: Take 4 mg by mouth every six (6) hours as needed for Nausea or Nausea or Vomiting.   sertraline (ZOLOFT) 50 mg tablet 3/17/2021  Yes   Sig: Take 50 mg by mouth nightly. Facility-Administered Medications: None     Please contact the main inpatient pharmacy with any questions or concerns at [phone] and we will direct you to the clinical pharmacist covering this patient's care while in-house.    PETER Alexandre

## 2021-03-19 NOTE — ANESTHESIA POSTPROCEDURE EVALUATION
Post-Anesthesia Evaluation and Assessment    Patient: Sinai Bridges MRN: [id number]  SSN: [id number]    YOB: 1931  Age: 80 y.o. Sex: female      I have evaluated the patient and they are stable and ready for discharge from the PACU. Cardiovascular Function/Vital Signs  Visit Vitals  BP (!) 93/54   Pulse 62   Temp 36.1 °C (97 °F)   Resp 14   Ht 5' 5\" (1.651 m)   Wt 45.4 kg (100 lb)   SpO2 96%   BMI 16.64 kg/m²       Patient is status post Spinal anesthesia for Procedure(s):  LEFT FEMUR INSERTION INTRA MEDULLARY NAIL  ESSENTIAL. Nausea/Vomiting: None    Postoperative hydration reviewed and adequate. Pain:  Pain Scale 1: Numeric (0 - 10) (03/19/21 1236)  Pain Intensity 1: 0 (03/19/21 1236)   Managed    Neurological Status:   Neuro (WDL): Within Defined Limits (03/19/21 1212)  Neuro  Neurologic State: Alert; Appropriate for age (03/19/21 1212)  Orientation Level: Disoriented to time;Oriented to person;Oriented to place;Oriented to situation (03/19/21 1212)  LUE Motor Response: Purposeful (03/19/21 1212)  LLE Motor Response: Purposeful;Numbness (03/19/21 1212)  RUE Motor Response: Purposeful (03/19/21 1212)  RLE Motor Response: Numbness; Purposeful (03/19/21 1212)   At baseline    Mental Status, Level of Consciousness: Alert and  oriented to person, place, and time    Pulmonary Status:   O2 Device: Nasal cannula (03/19/21 1212)   Adequate oxygenation and airway patent    Complications related to anesthesia: None    Post-anesthesia assessment completed. No concerns    Signed By: Melissa Mendoza MD     March 19, 2021              Post-Anesthesia Evaluation and Assessment    Patient: Sinai Bridges MRN: [id number]  SSN: [id number]    YOB: 1931  Age: 80 y.o. Sex: female      I have evaluated the patient and they are stable and ready for discharge from the PACU.      Cardiovascular Function/Vital Signs  Visit Vitals  BP (!) 93/54   Pulse 62   Temp 36.1 °C (97 °F)   Resp 14   Ht 5' 5\" (1.651 m)   Wt 45.4 kg (100 lb)   SpO2 96%   BMI 16.64 kg/m²       Patient is status post Spinal anesthesia for Procedure(s):  LEFT FEMUR INSERTION INTRA MEDULLARY NAIL  ESSENTIAL. Nausea/Vomiting: None    Postoperative hydration reviewed and adequate. Pain:  Pain Scale 1: Numeric (0 - 10) (03/19/21 1236)  Pain Intensity 1: 0 (03/19/21 1236)   Managed    Neurological Status:   Neuro (WDL): Within Defined Limits (03/19/21 1212)  Neuro  Neurologic State: Alert; Appropriate for age (03/19/21 1212)  Orientation Level: Disoriented to time;Oriented to person;Oriented to place;Oriented to situation (03/19/21 1212)  LUE Motor Response: Purposeful (03/19/21 1212)  LLE Motor Response: Purposeful;Numbness (03/19/21 1212)  RUE Motor Response: Purposeful (03/19/21 1212)  RLE Motor Response: Numbness; Purposeful (03/19/21 1212)   At baseline    Mental Status, Level of Consciousness: Alert and  oriented to person, place, and time    Pulmonary Status:   O2 Device: Nasal cannula (03/19/21 1212)   Adequate oxygenation and airway patent    Complications related to anesthesia: None    Post-anesthesia assessment completed. No concerns    Signed By: Master Hampton MD     March 19, 2021              Procedure(s):  LEFT FEMUR INSERTION INTRA MEDULLARY NAIL  ESSENTIAL. spinal    <BSHSIANPOST>    INITIAL Post-op Vital signs:   Vitals Value Taken Time   BP 93/54 03/19/21 1245   Temp 36.1 °C (97 °F) 03/19/21 1212   Pulse 67 03/19/21 1248   Resp 19 03/19/21 1248   SpO2 96 % 03/19/21 1248   Vitals shown include unvalidated device data.

## 2021-03-19 NOTE — PROGRESS NOTES
Occupational Therapy  3/19/2021    Chart reviewed and referral received. Pt is POD 0 L femur IM nailing. Will follow up tomorrow for OT assessment.  Job Pillar, OT

## 2021-03-19 NOTE — PROGRESS NOTES
TRANSFER - IN REPORT:    Verbal report received from Amandeep Pablo [id number], [facility] (name) on [facility]  being received from PACU (unit) for routine post - op      Report consisted of patients Situation, Background, Assessment and   Recommendations(SBAR). Information from the following report(s) SBAR, OR Summary, Procedure Summary, MAR and Recent Results was reviewed with the receiving nurse. Opportunity for questions and clarification was provided. Assessment completed upon patients arrival to unit and care assumed.

## 2021-03-19 NOTE — PROGRESS NOTES
Bedside and Verbal shift change report given to Beth Sanchez (oncoming nurse) by Renetta Mckinney RN (offgoing nurse). Report included the following information SBAR, Kardex and MAR.

## 2021-03-20 LAB
ANION GAP SERPL CALC-SCNC: 5 MMOL/L (ref 5–15)
BASOPHILS # BLD: 0 K/UL (ref 0–0.1)
BASOPHILS NFR BLD: 0 % (ref 0–1)
BUN SERPL-MCNC: 14 MG/DL (ref 6–20)
BUN/CREAT SERPL: 23 (ref 12–20)
CALCIUM SERPL-MCNC: 8.5 MG/DL (ref 8.5–10.1)
CHLORIDE SERPL-SCNC: 114 MMOL/L (ref 97–108)
CO2 SERPL-SCNC: 24 MMOL/L (ref 21–32)
CREAT SERPL-MCNC: 0.6 MG/DL (ref 0.55–1.02)
DIFFERENTIAL METHOD BLD: ABNORMAL
EOSINOPHIL # BLD: 0 K/UL (ref 0–0.4)
EOSINOPHIL NFR BLD: 0 % (ref 0–7)
ERYTHROCYTE [DISTWIDTH] IN BLOOD BY AUTOMATED COUNT: 12.9 % (ref 11.5–14.5)
GLUCOSE SERPL-MCNC: 106 MG/DL (ref 65–100)
HCT VFR BLD AUTO: 28.1 % (ref 35–47)
HGB BLD-MCNC: 8.6 G/DL (ref 11.5–16)
IMM GRANULOCYTES # BLD AUTO: 0.1 K/UL (ref 0–0.04)
IMM GRANULOCYTES NFR BLD AUTO: 1 % (ref 0–0.5)
LYMPHOCYTES # BLD: 1 K/UL (ref 0.8–3.5)
LYMPHOCYTES NFR BLD: 8 % (ref 12–49)
MCH RBC QN AUTO: 30.8 PG (ref 26–34)
MCHC RBC AUTO-ENTMCNC: 30.6 G/DL (ref 30–36.5)
MCV RBC AUTO: 100.7 FL (ref 80–99)
MONOCYTES # BLD: 1.3 K/UL (ref 0–1)
MONOCYTES NFR BLD: 11 % (ref 5–13)
NEUTS SEG # BLD: 9.8 K/UL (ref 1.8–8)
NEUTS SEG NFR BLD: 80 % (ref 32–75)
NRBC # BLD: 0 K/UL (ref 0–0.01)
NRBC BLD-RTO: 0 PER 100 WBC
PLATELET # BLD AUTO: 174 K/UL (ref 150–400)
PMV BLD AUTO: 10.9 FL (ref 8.9–12.9)
POTASSIUM SERPL-SCNC: 3.7 MMOL/L (ref 3.5–5.1)
RBC # BLD AUTO: 2.79 M/UL (ref 3.8–5.2)
SODIUM SERPL-SCNC: 143 MMOL/L (ref 136–145)
WBC # BLD AUTO: 12.1 K/UL (ref 3.6–11)

## 2021-03-20 PROCEDURE — 85025 COMPLETE CBC W/AUTO DIFF WBC: CPT

## 2021-03-20 PROCEDURE — 65270000029 HC RM PRIVATE

## 2021-03-20 PROCEDURE — 80048 BASIC METABOLIC PNL TOTAL CA: CPT

## 2021-03-20 PROCEDURE — 97165 OT EVAL LOW COMPLEX 30 MIN: CPT

## 2021-03-20 PROCEDURE — 74011250636 HC RX REV CODE- 250/636: Performed by: NURSE PRACTITIONER

## 2021-03-20 PROCEDURE — 97530 THERAPEUTIC ACTIVITIES: CPT

## 2021-03-20 PROCEDURE — 74011250637 HC RX REV CODE- 250/637: Performed by: ORTHOPAEDIC SURGERY

## 2021-03-20 PROCEDURE — 74011250637 HC RX REV CODE- 250/637: Performed by: FAMILY MEDICINE

## 2021-03-20 PROCEDURE — 74011250636 HC RX REV CODE- 250/636: Performed by: ORTHOPAEDIC SURGERY

## 2021-03-20 PROCEDURE — 97163 PT EVAL HIGH COMPLEX 45 MIN: CPT

## 2021-03-20 PROCEDURE — 74011250637 HC RX REV CODE- 250/637: Performed by: NURSE PRACTITIONER

## 2021-03-20 PROCEDURE — 74011000250 HC RX REV CODE- 250: Performed by: ORTHOPAEDIC SURGERY

## 2021-03-20 PROCEDURE — 36415 COLL VENOUS BLD VENIPUNCTURE: CPT

## 2021-03-20 RX ORDER — TRAMADOL HYDROCHLORIDE 50 MG/1
50 TABLET ORAL
Status: DISCONTINUED | OUTPATIENT
Start: 2021-03-20 | End: 2021-03-24 | Stop reason: HOSPADM

## 2021-03-20 RX ORDER — HYDROCODONE BITARTRATE AND ACETAMINOPHEN 5; 325 MG/1; MG/1
1 TABLET ORAL
Status: DISCONTINUED | OUTPATIENT
Start: 2021-03-20 | End: 2021-03-24 | Stop reason: HOSPADM

## 2021-03-20 RX ADMIN — CARBIDOPA AND LEVODOPA 1 TABLET: 25; 100 TABLET ORAL at 17:08

## 2021-03-20 RX ADMIN — POLYETHYLENE GLYCOL 3350 17 G: 17 POWDER, FOR SOLUTION ORAL at 10:48

## 2021-03-20 RX ADMIN — DORZOLAMIDE HYDROCHLORIDE 1 DROP: 20 SOLUTION/ DROPS OPHTHALMIC at 21:47

## 2021-03-20 RX ADMIN — SERTRALINE 50 MG: 50 TABLET, FILM COATED ORAL at 21:46

## 2021-03-20 RX ADMIN — DORZOLAMIDE HYDROCHLORIDE 1 DROP: 20 SOLUTION/ DROPS OPHTHALMIC at 10:48

## 2021-03-20 RX ADMIN — ACETAMINOPHEN 650 MG: 325 TABLET ORAL at 00:04

## 2021-03-20 RX ADMIN — LORAZEPAM 0.25 MG: 0.5 TABLET ORAL at 10:47

## 2021-03-20 RX ADMIN — ACETAMINOPHEN 650 MG: 325 TABLET ORAL at 19:46

## 2021-03-20 RX ADMIN — Medication 10 ML: at 00:25

## 2021-03-20 RX ADMIN — SODIUM CHLORIDE 125 ML/HR: 9 INJECTION, SOLUTION INTRAVENOUS at 07:13

## 2021-03-20 RX ADMIN — TIMOLOL MALEATE 1 DROP: 5 SOLUTION OPHTHALMIC at 21:47

## 2021-03-20 RX ADMIN — Medication 1 TABLET: at 17:08

## 2021-03-20 RX ADMIN — CARBIDOPA AND LEVODOPA 1 TABLET: 25; 100 TABLET ORAL at 21:46

## 2021-03-20 RX ADMIN — CARBIDOPA AND LEVODOPA 1 TABLET: 25; 100 TABLET ORAL at 10:47

## 2021-03-20 RX ADMIN — Medication 1 TABLET: at 07:12

## 2021-03-20 RX ADMIN — TRAMADOL HYDROCHLORIDE 50 MG: 50 TABLET, COATED ORAL at 07:10

## 2021-03-20 RX ADMIN — Medication 10 ML: at 22:00

## 2021-03-20 RX ADMIN — DORZOLAMIDE HYDROCHLORIDE 1 DROP: 20 SOLUTION/ DROPS OPHTHALMIC at 00:00

## 2021-03-20 RX ADMIN — LATANOPROST 1 DROP: 50 SOLUTION OPHTHALMIC at 21:47

## 2021-03-20 RX ADMIN — TIMOLOL MALEATE 1 DROP: 5 SOLUTION OPHTHALMIC at 10:48

## 2021-03-20 RX ADMIN — ONDANSETRON 4 MG: 2 INJECTION INTRAMUSCULAR; INTRAVENOUS at 00:21

## 2021-03-20 RX ADMIN — Medication 1 TABLET: at 13:57

## 2021-03-20 RX ADMIN — Medication 10 ML: at 07:10

## 2021-03-20 RX ADMIN — ACETAMINOPHEN 650 MG: 325 TABLET ORAL at 06:29

## 2021-03-20 RX ADMIN — DOCUSATE SODIUM 50 MG AND SENNOSIDES 8.6 MG 1 TABLET: 8.6; 5 TABLET, FILM COATED ORAL at 10:47

## 2021-03-20 RX ADMIN — TRAMADOL HYDROCHLORIDE 50 MG: 50 TABLET, COATED ORAL at 17:19

## 2021-03-20 RX ADMIN — CEFAZOLIN SODIUM 2 G: 1 INJECTION, POWDER, FOR SOLUTION INTRAMUSCULAR; INTRAVENOUS at 00:05

## 2021-03-20 RX ADMIN — LORAZEPAM 0.25 MG: 0.5 TABLET ORAL at 00:21

## 2021-03-20 RX ADMIN — ENOXAPARIN SODIUM 40 MG: 40 INJECTION SUBCUTANEOUS at 10:47

## 2021-03-20 RX ADMIN — DOCUSATE SODIUM 50 MG AND SENNOSIDES 8.6 MG 1 TABLET: 8.6; 5 TABLET, FILM COATED ORAL at 17:08

## 2021-03-20 NOTE — PROGRESS NOTES
Problem: Mobility Impaired (Adult and Pediatric)  Goal: *Acute Goals and Plan of Care (Insert Text)  Description: FUNCTIONAL STATUS PRIOR TO ADMISSION: Patient was modified independent using a rollator for functional mobility. HOME SUPPORT PRIOR TO ADMISSION: The patient lived in assisted living at [facility]. Physical Therapy Goals  Initiated 3/20/2021  1. Patient will move from supine to sit and sit to supine  in bed with supervision/set-up within 7 day(s). 2.  Patient will transfer from bed to chair and chair to bed with minimal assistance/contact guard assist using the least restrictive device within 7 day(s). 3.  Patient will perform sit to stand with minimal assistance/contact guard assist within 7 day(s). 4.  Patient will ambulate with minimal assistance/contact guard assist for 50 feet with the least restrictive device within 7 day(s). Outcome: Progressing Towards Goal     PHYSICAL THERAPY EVALUATION  Patient: Monalisa Montanez (7 y.o. female)  Date: 3/20/2021  Primary Diagnosis: Hip fracture (HCC) [S72.009A]  Procedure(s) (LRB):  LEFT FEMUR INSERTION INTRA MEDULLARY NAIL  ESSENTIAL (Left) 1 Day Post-Op   Precautions: WBAT    ASSESSMENT  Based on the objective data described below, the patient presents with left hip pain and decreased functional mobility following left hip IM nail on 3/19/2021. Prior to surgery, she lived at [facility]. She is a poor historian, thus it is questionable that she lived in assisted living. Her initial injury occurred walking with her rollator, and she fell onto her left hip. Today she was received in supine. She was oriented to self only, but was frequently reminded of where she was and why PT and PT were helping her. She is min A for supine to sit transfer to EOB. She needed mod A for scooting. Attempted sit to stand 3x with mod A. She needed frequent encouragement for short sidesteps for bed to chair transfer.  Chair alarm was provided. All needs were met. Will continue to progress 2x daily. Current Level of Function Impacting Discharge (mobility/balance): Min A for supine-sit, Mod A for scooting and sit to stand transfer. Mod to Max A x 2 for bed to chair transfer. Functional Outcome Measure: The patient scored 20 on the Barthel outcome measure which is indicative of high fall risk. Other factors to consider for discharge: Intermittent confusion. Patient will benefit from skilled therapy intervention to address the above noted impairments. PLAN :  Recommendations and Planned Interventions: bed mobility training, transfer training, gait training, therapeutic exercises, neuromuscular re-education, orthotic/prosthetic training, modalities, edema management/control, patient and family training/education, and therapeutic activities      Frequency/Duration: Patient will be followed by physical therapy:  twice daily to address goals. Recommendation for discharge: (in order for the patient to meet his/her long term goals)  Therapy up to 5 days/week in SNF setting    This discharge recommendation:  Has been made in collaboration with the attending provider and/or case management    IF patient discharges home will need the following DME: patient owns DME required for discharge         SUBJECTIVE:   Patient stated My hip only hurts when I move it.     OBJECTIVE DATA SUMMARY:   HISTORY:    Past Medical History:   Diagnosis Date    Arthritis     Scoliosis      Past Surgical History:   Procedure Laterality Date    HX ORTHOPAEDIC         Personal factors and/or comorbidities impacting plan of care: Confusion     Home Situation  Home Environment: Other (comment)(Maricruz Moser)  One/Two Story Residence: One story  Support Systems: Child(kallie), Family member(s), Assisted living  Patient Expects to be Discharged to[de-identified] Assisted living  Current DME Used/Available at Home: Shaina Richter rollator    EXAMINATION/PRESENTATION/DECISION MAKING:   Critical Behavior:  Neurologic State: Alert, Appropriate for age, Confused  Orientation Level: Oriented to person  Cognition: Decreased attention/concentration  Safety/Judgement: Decreased awareness of need for safety  Hearing:     Skin:    Edema:   Range Of Motion:  AROM: Generally decreased, functional    PROM: Generally decreased, functional           Strength:    Strength: Generally decreased, functional          Tone & Sensation:   Tone: Normal              Sensation: Intact               Coordination:  Coordination: Generally decreased, functional  Vision:      Functional Mobility:  Bed Mobility:  Rolling: Minimum assistance  Supine to Sit: Minimum assistance  Sit to Supine: Moderate assistance  Scooting: Moderate assistance  Transfers:  Sit to Stand: Moderate assistance;Assist x2  Stand to Sit: Moderate assistance;Assist x2  Stand Pivot Transfers: Moderate assistance;Assist x2                    Balance:   Sitting: Intact; With support  Standing: Impaired; With support  Ambulation/Gait Training:  Distance (ft): 5 Feet (ft)  Assistive Device: Gait belt;Walker, rolling  Ambulation - Level of Assistance: Maximum assistance;Assist x2     Gait Description (WDL): Exceptions to WDL  Gait Abnormalities: Antalgic;Decreased step clearance     Left Side Weight Bearing: As tolerated  Base of Support: Shift to right  Stance: Left decreased  Speed/Cassie: Shuffled  Step Length: Right shortened;Left shortened         Functional Measure:  Barthel Index:    Bathin  Bladder: 0  Bowels: 0  Groomin  Dressin  Feeding: 10  Mobility: 5  Stairs: 0  Toilet Use: 0  Transfer (Bed to Chair and Back): 5  Total: 20/100       The Barthel ADL Index: Guidelines  1. The index should be used as a record of what a patient does, not as a record of what a patient could do.   2. The main aim is to establish degree of independence from any help, physical or verbal, however minor and for whatever reason. 3. The need for supervision renders the patient not independent. 4. A patient's performance should be established using the best available evidence. Asking the patient, friends/relatives and nurses are the usual sources, but direct observation and common sense are also important. However direct testing is not needed. 5. Usually the patient's performance over the preceding 24-48 hours is important, but occasionally longer periods will be relevant. 6. Middle categories imply that the patient supplies over 50 per cent of the effort. 7. Use of aids to be independent is allowed. Cally Pradhan., Barthel, D.W. (1818). Functional evaluation: the Barthel Index. [facility] (14)2. MASON DeeF, Talisha Duarte., Threasa Hammans., [place], [address] (). Measuring the change indisability after inpatient rehabilitation; comparison of the responsiveness of the Barthel Index and Functional [place] Measure. Journal of Neurology, Neurosurgery, and Psychiatry, 66(4), 007-189. Miles Can, N.J.A, JES Tyson, & Rigoberto Osman M.A. (2004.) Assessment of post-stroke quality of life in cost-effectiveness studies: The usefulness of the Barthel Index and the EuroQoL-5D.  Quality of Life Research, 15, 053-06            Physical Therapy Evaluation Charge Determination   History Examination Presentation Decision-Making   HIGH Complexity :3+ comorbidities / personal factors will impact the outcome/ POC  HIGH Complexity : 4+ Standardized tests and measures addressing body structure, function, activity limitation and / or participation in recreation  HIGH Complexity : Unstable and unpredictable characteristics  HIGH Complexity : FOTO score of 1- 25       Based on the above components, the patient evaluation is determined to be of the following complexity level: HIGH     Pain Ratin/10    Activity Tolerance:   Fair and requires rest breaks    After treatment patient left in no apparent distress:   Sitting in chair, Call bell within reach, and Bed / chair alarm activated    COMMUNICATION/EDUCATION:   The patients plan of care was discussed with: Registered nurse. Fall prevention education was provided and the patient/caregiver indicated understanding., Patient/family have participated as able in goal setting and plan of care. , and Patient/family agree to work toward stated goals and plan of care.     Thank you for this referral.  Faiza Sierra, PT   Time Calculation: 45 mins

## 2021-03-20 NOTE — PROGRESS NOTES
Ortho Daily Progress Note      Patient: Spike Perez                   MRN: [id number]  Sex: female  YOB: 1931           Age: 80 y.o.    1 Day Post-Op    Procedure(s): LEFT FEMUR INSERTION INTRA MEDULLARY NAIL      Subjective: Working with therapy, left hip sore     Visit Vitals  BP 96/60 (BP 1 Location: Right upper arm)   Pulse 69   Temp 97.8 °F (36.6 °C)   Resp 18   Ht 5' 5\" (1.651 m)   Wt 45.4 kg (100 lb)   SpO2 96%   BMI 16.64 kg/m²        Lab Results:  HGB   Date/Time Value Ref Range Status   03/20/2021 07:07 AM 8.6 (L) 11.5 - 16.0 g/dL Final     INR   Date/Time Value Ref Range Status   03/18/2021 06:01 PM 1.1 0.9 - 1.1   Final     Comment:     A single therapeutic range for Vit K antagonists may not be optimal for all indications - see June, 2008 issue of Chest, American College of Chest Physicians Evidence-Based Clinical Practice Guidelines, 8th Edition.        Physical Exam:    GENERAL: 79yo female, alert, cooperative, no distress, appears stated age  DRESSING: clean/dry  SWELLING: mild  NEUROLOGICAL: intact  PULSE:yes   DVT Exam: No evidence of DVT seen on physical exam.      Plan:    DVT prophylaxis: Lovenox injections 40mg sub q once daily until 10 days post-op  Weight bearing restriction WBAT  Pain Control:stable, mild-to-moderate joint symptoms intermittently, reasonably well controlled by current meds  Dispo: anticipate SNF placement  Follow-up with Dr. Luly Guardado in 1 month    [address] [place] [address]  3/20/2021   11:38 AM      .

## 2021-03-20 NOTE — PROGRESS NOTES
Problem: Self Care Deficits Care Plan (Adult)  Goal: *Acute Goals and Plan of Care (Insert Text)  Description:   FUNCTIONAL STATUS PRIOR TO ADMISSION: Patient was modified independent using a rollator for functional mobility, lives at [address], and per patient report, was was modified independent for basic and instrumental ADLs. HOME SUPPORT: Admitted from [facility], reports she was living in [address] [place] [address] but not confirmed d/t confusion    Occupational Therapy Goals  Initiated 3/20/2021  1. Patient will perform standing grooming x2 minutes with minimal assistance within 7 day(s). 2.  Patient will perform bathing with minimal assistance & AE PRN within 7 day(s). 3.  Patient will perform lower body dressing with minimal assistance & AE PRN within 7 day(s). 4.  Patient will perform toilet transfers with minimal assistance within 7 day(s). 5.  Patient will perform all aspects of toileting with minimal assistance within 7 day(s). 6.  Patient will participate in upper extremity therapeutic exercise/activities with supervision/set-up for 5 minutes within 7 day(s). 7.  Patient will utilize energy conservation techniques during functional activities with verbal and visual cues within 7 day(s). Outcome: Not Met     OCCUPATIONAL THERAPY EVALUATION  Patient: Lalo Miller (24 y.o. female)  Date: 3/20/2021  Primary Diagnosis: Hip fracture (HCC) [S72.009A]  Procedure(s) (LRB):  LEFT FEMUR INSERTION INTRA MEDULLARY NAIL  ESSENTIAL (Left) 1 Day Post-Op   Precautions: Fall,  WBAT LLE    ASSESSMENT  Based on the objective data described below, the patient presents with decreased balance, activity tolerance, safety awareness, LLE ROM & strength, coordination, cognition (memory, sequencing, problem solving), lower body access/functional reach, and pain management s/p admission for GLF, now POD #1 s/p L femur IM nailing.  PTA, patient Mod I for ADLs and mobility with rollator use, reports she lives at [facility]. She now presents far from her Mod I baseline, requiring Min-Mod A x2 for brief OOB mobility to the chair with RW support, largely Max-Total A for lower body ADLs. Increased time and mod-max cues for sequencing required with all functional activity with cues for redirection to task throughout. Recommend d/c to SNF to maximize safety & functional independence. Current Level of Function Impacting Discharge (ADLs/self-care): setup for upper body ADLs, Max-Total A for lower body ADLs, and Min-Mod A x2 for brief OOB mobility with RW    Functional Outcome Measure: The patient scored 20/100 on the Barthel Index outcome measure which is indicative of 80% impairment in ADLs and mobility, infer >60% dependence on others for IADLs. Other factors to consider for discharge: fall risk, assist of 2, PMH, PLOF     Patient will benefit from skilled therapy intervention to address the above noted impairments. PLAN :  Recommendations and Planned Interventions: self care training, functional mobility training, therapeutic exercise, balance training, therapeutic activities, endurance activities, patient education, home safety training, and family training/education    Frequency/Duration: Patient will be followed by occupational therapy 5 times a week to address goals. Recommendation for discharge: (in order for the patient to meet his/her long term goals)  Therapy up to 5 days/week in SNF setting    This discharge recommendation:  Has been made in collaboration with the attending provider and/or case management    IF patient discharges home will need the following DME: To be determined (TBD)         SUBJECTIVE:   Patient stated Sorry if I'm yelling because of this pain.     OBJECTIVE DATA SUMMARY:   HISTORY:   Past Medical History:   Diagnosis Date    Arthritis     Scoliosis      Past Surgical History:   Procedure Laterality Date    HX ORTHOPAEDIC         Expanded or extensive additional review of patient history:     Home Situation  Home Environment: Other (comment)(Maricruz Moser)  One/Two Story Residence: One story  Support Systems: Child(kallie), Family member(s), Assisted living  Patient Expects to be Discharged to[de-identified] Assisted living  Current DME Used/Available at Home: haris David    Hand dominance: Right    EXAMINATION OF PERFORMANCE DEFICITS:  Cognitive/Behavioral Status:  Neurologic State: Alert; Appropriate for age;Confused  Orientation Level: Oriented to person  Cognition: Decreased attention/concentration; Follows commands  Perception: Appears intact  Perseveration: No perseveration noted  Safety/Judgement: Decreased awareness of environment;Decreased awareness of need for assistance;Decreased awareness of need for safety;Decreased insight into deficits    Skin: Appears intact, L hip dressing noted    Edema: Typical post-op LLE swelling    Hearing:       Vision/Perceptual:    Tracking: Able to track stimulus in all quadrants w/o difficulty                                Range of Motion:  AROM: Generally decreased, functional  PROM: Generally decreased, functional                      Strength:  Strength: Generally decreased, functional                Coordination:  Coordination: Generally decreased, functional  Fine Motor Skills-Upper: Left Intact; Right Intact    Gross Motor Skills-Upper: Left Intact; Right Intact    Tone & Sensation:  Tone: Normal  Sensation: Intact                      Balance:  Sitting: Intact; With support  Standing: Impaired; With support(RW)  Standing - Static: Poor;Constant support  Standing - Dynamic : Poor;Constant support    Functional Mobility and Transfers for ADLs:  Bed Mobility:  Rolling: Minimum assistance  Supine to Sit: Minimum assistance; Moderate assistance;Assist x2  Sit to Supine: (in chair)  Scooting: Minimum assistance; Moderate assistance;Assist x2    Transfers:  Sit to Stand:  Moderate assistance;Assist x2  Stand to Sit: Moderate assistance;Assist x2  Toilet Transfer : Moderate assistance;Assist x2;Adaptive equipment(infer to [facility] w/ DME per mobility)  Assistive Device : Gait Belt;Walker, rolling    ADL Assessment:  Patient recalled and demonstrated avoiding extreme planes of movement with Left LE during ADLs and functional mobility with min cues. Feeding: Setup    Oral Facial Hygiene/Grooming: Setup(infer seated d/t mobility impairment)    Bathing: Moderate assistance    Upper Body Dressing: Setup    Lower Body Dressing: Total assistance    Toileting: Total assistance                ADL Intervention and task modifications:    Lower Body Dressing Assistance  Socks: Total assistance (dependent)(simulated)    Toileting  Toileting Assistance: Total assistance(dependent)(brief)    Cognitive Retraining  Safety/Judgement: Decreased awareness of environment;Decreased awareness of need for assistance;Decreased awareness of need for safety;Decreased insight into deficits      Functional Measure:  Barthel Index:    Bathin  Bladder: 0  Bowels: 0  Groomin  Dressin  Feeding: 10  Mobility: 0  Stairs: 0  Toilet Use: 0  Transfer (Bed to Chair and Back): 5  Total: 20/100        The Barthel ADL Index: Guidelines  1. The index should be used as a record of what a patient does, not as a record of what a patient could do. 2. The main aim is to establish degree of independence from any help, physical or verbal, however minor and for whatever reason. 3. The need for supervision renders the patient not independent. 4. A patient's performance should be established using the best available evidence. Asking the patient, friends/relatives and nurses are the usual sources, but direct observation and common sense are also important. However direct testing is not needed. 5. Usually the patient's performance over the preceding 24-48 hours is important, but occasionally longer periods will be relevant.   6. Middle categories imply that the patient supplies over 50 per cent of the effort. 7. Use of aids to be independent is allowed. Shelly Holliday., Barthel, D.W. (3845). Functional evaluation: the Barthel Index. [address] [facility] (14)2. ESTEFANY Prabhakar, Juan Pablo Bailey., Gracia Shepherd., [place] [facility], [address] (1999). Measuring the change indisability after inpatient rehabilitation; comparison of the responsiveness of the Barthel Index and Functional [place] Measure. Journal of Neurology, Neurosurgery, and Psychiatry, 66(4), 868-138. SANDRA Sarkar, JES Tyson, & Eduardo Tomlinson M.A. (2004.) Assessment of post-stroke quality of life in cost-effectiveness studies: The usefulness of the Barthel Index and the EuroQoL-5D. Quality of Life Research, 15, 915-02         Occupational Therapy Evaluation Charge Determination   History Examination Decision-Making   LOW Complexity : Brief history review  MEDIUM Complexity : 3-5 performance deficits relating to physical, cognitive , or psychosocial skils that result in activity limitations and / or participation restrictions LOW Complexity : No comorbidities that affect functional and no verbal or physical assistance needed to complete eval tasks       Based on the above components, the patient evaluation is determined to be of the following complexity level: LOW   Pain Rating:  L hip pain reported throughout, easily redirectable    Activity Tolerance:   Fair and requires rest breaks    After treatment patient left in no apparent distress:    Sitting in chair, Call bell within reach, and Bed / chair alarm activated    COMMUNICATION/EDUCATION:   The patients plan of care was discussed with: Physical therapist and Registered nurse. Home safety education was provided and the patient/caregiver indicated understanding., Patient/family have participated as able in goal setting and plan of care. , and Patient/family agree to work toward stated goals and plan of care.     This patients plan of care is appropriate for delegation to CATERINA.    Thank you for this referral.  Graciela Villanueva, OTD, OTR/L  Time Calculation: 42 mins

## 2021-03-21 LAB
ABO + RH BLD: NORMAL
ANION GAP SERPL CALC-SCNC: 5 MMOL/L (ref 5–15)
ATRIAL RATE: 62 BPM
BASOPHILS # BLD: 0 K/UL (ref 0–0.1)
BASOPHILS NFR BLD: 0 % (ref 0–1)
BLOOD GROUP ANTIBODIES SERPL: NORMAL
BUN SERPL-MCNC: 17 MG/DL (ref 6–20)
BUN/CREAT SERPL: 31 (ref 12–20)
CALCIUM SERPL-MCNC: 8.6 MG/DL (ref 8.5–10.1)
CALCULATED P AXIS, ECG09: 25 DEGREES
CALCULATED R AXIS, ECG10: -14 DEGREES
CALCULATED T AXIS, ECG11: 16 DEGREES
CHLORIDE SERPL-SCNC: 109 MMOL/L (ref 97–108)
CO2 SERPL-SCNC: 26 MMOL/L (ref 21–32)
CREAT SERPL-MCNC: 0.55 MG/DL (ref 0.55–1.02)
DIAGNOSIS, 93000: NORMAL
DIFFERENTIAL METHOD BLD: ABNORMAL
EOSINOPHIL # BLD: 0.3 K/UL (ref 0–0.4)
EOSINOPHIL NFR BLD: 3 % (ref 0–7)
ERYTHROCYTE [DISTWIDTH] IN BLOOD BY AUTOMATED COUNT: 13.1 % (ref 11.5–14.5)
GLUCOSE SERPL-MCNC: 104 MG/DL (ref 65–100)
HCT VFR BLD AUTO: 23.4 % (ref 35–47)
HCT VFR BLD AUTO: 32.3 % (ref 35–47)
HGB BLD-MCNC: 10.4 G/DL (ref 11.5–16)
HGB BLD-MCNC: 7.4 G/DL (ref 11.5–16)
HISTORY CHECKED?,CKHIST: NORMAL
IMM GRANULOCYTES # BLD AUTO: 0.1 K/UL (ref 0–0.04)
IMM GRANULOCYTES NFR BLD AUTO: 1 % (ref 0–0.5)
LYMPHOCYTES # BLD: 0.9 K/UL (ref 0.8–3.5)
LYMPHOCYTES NFR BLD: 10 % (ref 12–49)
MCH RBC QN AUTO: 30.8 PG (ref 26–34)
MCHC RBC AUTO-ENTMCNC: 31.6 G/DL (ref 30–36.5)
MCV RBC AUTO: 97.5 FL (ref 80–99)
MONOCYTES # BLD: 0.9 K/UL (ref 0–1)
MONOCYTES NFR BLD: 10 % (ref 5–13)
NEUTS SEG # BLD: 6.7 K/UL (ref 1.8–8)
NEUTS SEG NFR BLD: 76 % (ref 32–75)
NRBC # BLD: 0 K/UL (ref 0–0.01)
NRBC BLD-RTO: 0 PER 100 WBC
P-R INTERVAL, ECG05: 154 MS
PLATELET # BLD AUTO: 152 K/UL (ref 150–400)
PMV BLD AUTO: 10.9 FL (ref 8.9–12.9)
POTASSIUM SERPL-SCNC: 3.8 MMOL/L (ref 3.5–5.1)
Q-T INTERVAL, ECG07: 400 MS
QRS DURATION, ECG06: 104 MS
QTC CALCULATION (BEZET), ECG08: 406 MS
RBC # BLD AUTO: 2.4 M/UL (ref 3.8–5.2)
SODIUM SERPL-SCNC: 140 MMOL/L (ref 136–145)
SPECIMEN EXP DATE BLD: NORMAL
VENTRICULAR RATE, ECG03: 62 BPM
WBC # BLD AUTO: 8.9 K/UL (ref 3.6–11)

## 2021-03-21 PROCEDURE — 74011250637 HC RX REV CODE- 250/637: Performed by: FAMILY MEDICINE

## 2021-03-21 PROCEDURE — 65270000029 HC RM PRIVATE

## 2021-03-21 PROCEDURE — 74011250636 HC RX REV CODE- 250/636: Performed by: NURSE PRACTITIONER

## 2021-03-21 PROCEDURE — 74011250637 HC RX REV CODE- 250/637: Performed by: ORTHOPAEDIC SURGERY

## 2021-03-21 PROCEDURE — 74011000250 HC RX REV CODE- 250: Performed by: ORTHOPAEDIC SURGERY

## 2021-03-21 PROCEDURE — 97530 THERAPEUTIC ACTIVITIES: CPT

## 2021-03-21 PROCEDURE — 74011250636 HC RX REV CODE- 250/636: Performed by: ORTHOPAEDIC SURGERY

## 2021-03-21 PROCEDURE — 80048 BASIC METABOLIC PNL TOTAL CA: CPT

## 2021-03-21 PROCEDURE — 74011250637 HC RX REV CODE- 250/637: Performed by: NURSE PRACTITIONER

## 2021-03-21 PROCEDURE — 30233N1 TRANSFUSION OF NONAUTOLOGOUS RED BLOOD CELLS INTO PERIPHERAL VEIN, PERCUTANEOUS APPROACH: ICD-10-PCS | Performed by: FAMILY MEDICINE

## 2021-03-21 PROCEDURE — 85018 HEMOGLOBIN: CPT

## 2021-03-21 PROCEDURE — 93005 ELECTROCARDIOGRAM TRACING: CPT

## 2021-03-21 PROCEDURE — 85025 COMPLETE CBC W/AUTO DIFF WBC: CPT

## 2021-03-21 PROCEDURE — 36430 TRANSFUSION BLD/BLD COMPNT: CPT

## 2021-03-21 PROCEDURE — P9016 RBC LEUKOCYTES REDUCED: HCPCS

## 2021-03-21 PROCEDURE — 97116 GAIT TRAINING THERAPY: CPT

## 2021-03-21 PROCEDURE — 86901 BLOOD TYPING SEROLOGIC RH(D): CPT

## 2021-03-21 PROCEDURE — 36415 COLL VENOUS BLD VENIPUNCTURE: CPT

## 2021-03-21 RX ORDER — SODIUM CHLORIDE 9 MG/ML
250 INJECTION, SOLUTION INTRAVENOUS AS NEEDED
Status: DISCONTINUED | OUTPATIENT
Start: 2021-03-21 | End: 2021-03-24 | Stop reason: HOSPADM

## 2021-03-21 RX ADMIN — TRAMADOL HYDROCHLORIDE 50 MG: 50 TABLET, COATED ORAL at 19:28

## 2021-03-21 RX ADMIN — POLYETHYLENE GLYCOL 3350 17 G: 17 POWDER, FOR SOLUTION ORAL at 09:19

## 2021-03-21 RX ADMIN — Medication 1 TABLET: at 09:19

## 2021-03-21 RX ADMIN — Medication 1 TABLET: at 17:48

## 2021-03-21 RX ADMIN — DOCUSATE SODIUM 50 MG AND SENNOSIDES 8.6 MG 1 TABLET: 8.6; 5 TABLET, FILM COATED ORAL at 17:48

## 2021-03-21 RX ADMIN — CARBIDOPA AND LEVODOPA 1 TABLET: 25; 100 TABLET ORAL at 21:57

## 2021-03-21 RX ADMIN — DORZOLAMIDE HYDROCHLORIDE 1 DROP: 20 SOLUTION/ DROPS OPHTHALMIC at 16:30

## 2021-03-21 RX ADMIN — LATANOPROST 1 DROP: 50 SOLUTION OPHTHALMIC at 21:53

## 2021-03-21 RX ADMIN — TIMOLOL MALEATE 1 DROP: 5 SOLUTION OPHTHALMIC at 16:30

## 2021-03-21 RX ADMIN — DORZOLAMIDE HYDROCHLORIDE 1 DROP: 20 SOLUTION/ DROPS OPHTHALMIC at 21:53

## 2021-03-21 RX ADMIN — ACETAMINOPHEN 650 MG: 325 TABLET ORAL at 11:13

## 2021-03-21 RX ADMIN — ACETAMINOPHEN 650 MG: 325 TABLET ORAL at 17:48

## 2021-03-21 RX ADMIN — TIMOLOL MALEATE 1 DROP: 5 SOLUTION OPHTHALMIC at 09:20

## 2021-03-21 RX ADMIN — DORZOLAMIDE HYDROCHLORIDE 1 DROP: 20 SOLUTION/ DROPS OPHTHALMIC at 09:20

## 2021-03-21 RX ADMIN — Medication 1 TABLET: at 11:13

## 2021-03-21 RX ADMIN — DOCUSATE SODIUM 50 MG AND SENNOSIDES 8.6 MG 1 TABLET: 8.6; 5 TABLET, FILM COATED ORAL at 09:19

## 2021-03-21 RX ADMIN — SERTRALINE 50 MG: 50 TABLET, FILM COATED ORAL at 21:53

## 2021-03-21 RX ADMIN — CARBIDOPA AND LEVODOPA 1 TABLET: 25; 100 TABLET ORAL at 09:19

## 2021-03-21 RX ADMIN — ACETAMINOPHEN 650 MG: 325 TABLET ORAL at 07:00

## 2021-03-21 RX ADMIN — Medication 10 ML: at 08:13

## 2021-03-21 RX ADMIN — CARBIDOPA AND LEVODOPA 1 TABLET: 25; 100 TABLET ORAL at 17:48

## 2021-03-21 RX ADMIN — ONDANSETRON 4 MG: 2 INJECTION INTRAMUSCULAR; INTRAVENOUS at 09:16

## 2021-03-21 RX ADMIN — ACETAMINOPHEN 650 MG: 325 TABLET ORAL at 00:00

## 2021-03-21 RX ADMIN — ENOXAPARIN SODIUM 40 MG: 40 INJECTION SUBCUTANEOUS at 09:19

## 2021-03-21 NOTE — PROGRESS NOTES
Reason for Admission:  Fall-hip fx                     RUR Score:      9%               Plan for utilizing home health:    None      PCP: First and Last name:  Marjorie Ramirez MD     Name of Practice:    Are you a current patient: Yes/No: Yes   Approximate date of last visit: Unknown   Can you participate in a virtual visit with your PCP: No                    Current Advanced Directive/Advance Care Plan: Full Code- Patient has an advance directive and POA- daughter, Woody Marin to provide copy      Healthcare Decision Maker:   Click here to complete [address] including selection of the [address] Relationship (ie \"Primary\")                             Transition of Care Plan:         * [address] [facility],   * BLS transport  * IM provided    Kristy Gamez is an 80 yr old female who is   POD 2 of a L femur insertion intra medullary nail. Daughter at bedside verified demographics and PCP. Patient lives in assisted living at [facility]. At discharge patient will be admitted to the health care unit. Care Management Interventions  PCP Verified by CM:  Yes  Transition of Care Consult (CM Consult[facility]  Partner SNF: No  Reason Why Partner SNF Not Chosen: Friend/family recommendation  The Plan for Transition of Care is Related to the Following Treatment Goals : Health Care  The Patient and/or Patient Representative was Provided with a Choice of Provider and Agrees with the Discharge Plan?: Yes  Name of the Patient Representative Who was Provided with a Choice of Provider and Agrees with the Discharge Plan: Patient/Mila Pina  [place] of Choice List was Provided with Basic Dialogue that Supports the Patient's Individualized Plan of Care/Goals, Treatment Preferences and Shares the Quality Data Associated with the Providers?: Yes  [employer] Information Provided?: No  Discharge Location  Discharge Placement: Skilled nursing facility Toya Bonilla LCSW, [facility]-  Complex Care Coordinator/[place]  C: [phone]

## 2021-03-21 NOTE — PROGRESS NOTES
Physical Therapy    Attempted to see patient for am session, but patient complaining of neck and leg pain, nausea, dizziness. POC was to attempt [facility] or transfer to chair at bed side, but BP 97/59. Nurse in to assess and give meds. Will attempt to see patient later.   Israel Irving

## 2021-03-21 NOTE — PROGRESS NOTES
Hospitalist Progress Note          Oneil Hernandez NP  Please call  and page for questions. Call physician on-call through the  7pm-7am    Daily Progress Note: 3/20/2021    Primary care provider:Franchesca Herrmann MD    Date of admission: 3/18/2021 12:32 PM    Admission Summary:     From H&P 3/18/2021 12:32 PM:  Raghu Cadena is a 80 y.o. female who presents with after a fall  History is primary obtained from the patient and her daughter was present at the bedside  Patient reports that she fell this morning. Patient reports that her Rollator fell and she went down with her. Patient reports she did not hit her head. Patient reports that immediately she started having pain in her left hip. Patient got concerned and decided to come to the hospital.  Patient is found to have a left hip fracture and was requested to be admitted to the hospital service. Patient denies any other complaints or problems. The patient denies any Headache, blurry vision, sore throat, trouble swallowing, trouble with speech, chest pain, SOB, cough, fever, chills, N/V/D, abd pain, urinary symptoms, constipation, recent travels, sick contacts, focal or generalized neurological symptoms,, injuries, rashes, contact with COVID-19 diagnosed patients, hematemesis, melena, hemoptysis, hematuria, rashes, denies starting any new medications and denies any other concerns or problems besides as mentioned above. \"    Hospital Course:     03/19: Left IM hip nailing    Subjective:   F/U on L hip fracture    Pt seen today in bed in NAD. Slightly more confused today but overall pleasant. No pain currently. Working with therapy. Hgb stabilizing.     Assessment/Plan:       Left femoral fracture: s/p IM nailing (02/19)  -Resolved  -Pain control  -Monitor Hgb/BP  -PT/OT per ortho  -Appreciate ortho input    Abnormal UA: UTI ruled out  -Urine culture w/ no growth  -stop ceftriaxone    Dehydration  -Resolved w/ IVF  -Encourage PO intake    ABL Anemia  -Hgb POA 12.1-->10.1 this AM  -Hypotensive in PACU  -Monitor Hgb, transfuse for less than 7 or if hgb continues to drop and pt becomes symptomatic  -Monitor HH    Parkinsons: w/ intermittent confusion  -Stable, per daughter confusion started w/ COVID isolation and has increased some in last few months  -cont sinimet  -supportive care  -fall precuations  -judicial use of opiates    DVTppx: SCDs, lovenox  Gippx: NA  Code Status: Full code  Diet: Regular  Activity: OOB to chair TID and PRN  Discharge: Plan to discharge home w/ HH vs IPR pending progress; hopefully next 1-2 days          Review of Systems:     Full ROS complete with pertinent positives and negatives as per HPI, otherwise negative    Objective:   Physical Exam:     Visit Vitals  BP (!) 92/53 (BP 1 Location: Right arm)   Pulse 87   Temp 97.6 °F (36.4 °C)   Resp 16   Ht 5' 5\" (1.651 m)   Wt 45.4 kg (100 lb)   SpO2 97%   BMI 16.64 kg/m²    O2 Flow Rate (L/min): 2 l/min O2 Device: Room air    Temp (24hrs), Av.7 °F (36.5 °C), Min:97.4 °F (36.3 °C), Max:97.8 °F (36.6 °C)    No intake/output data recorded.  0701 -  1900  In: 2480 [P.O.:480; I.V.:2000]  Out: 2350 [Urine:2300]      General:  Alert, cooperative, no distress, appears stated age. Lungs:   Clear to auscultation bilaterally. Heart:  Regular rate and rhythm, S1, S2 normal, no murmur, click, rub or gallop. Abdomen:   Soft, non-tender, non-distended. Bowel sounds normal.    Extremities: Extremities normal, atraumatic, no cyanosis or edema. Left hip dressing c/d/i   Skin: Skin color, texture, turgor normal. No rashes or lesions   Neurologic: CNII-XII intact. Equal Strength. ANGULO.  A&Ox2-3 w/ intermittent confusion     Data Review:       Recent Days:  Recent Labs     21  0707 21  1318   WBC 12.1* 9.1 11.8*   HGB 8.6* 10.1* 12.1   HCT 28.1* 32.1* 38.3    213 203     Recent Labs     21  0707 21 03/18/21  1801 03/18/21  1318    141  --  136   K 3.7 4.0  --  4.0   * 111*  --  109*   CO2 24 26  --  23   * 106*  --  121*   BUN 14 18  --  22*   CREA 0.60 0.56  --  0.80   CA 8.5 8.6  --  9.0   ALB  --  3.1*  --  3.7   ALT  --  18  --  24   INR  --   --  1.1  --      No results for input(s): PH, PCO2, PO2, HCO3, FIO2 in the last 72 hours. 24 Hour Results:  Recent Results (from the past 24 hour(s))   CBC WITH AUTOMATED DIFF    Collection Time: 03/20/21  7:07 AM   Result Value Ref Range    WBC 12.1 (H) 3.6 - 11.0 K/uL    RBC 2.79 (L) 3.80 - 5.20 M/uL    HGB 8.6 (L) 11.5 - 16.0 g/dL    HCT 28.1 (L) 35.0 - 47.0 %    .7 (H) 80.0 - 99.0 FL    MCH 30.8 26.0 - 34.0 PG    MCHC 30.6 30.0 - 36.5 g/dL    RDW 12.9 11.5 - 14.5 %    PLATELET 586 338 - 950 K/uL    MPV 10.9 8.9 - 12.9 FL    NRBC 0.0 0  WBC    ABSOLUTE NRBC 0.00 0.00 - 0.01 K/uL    NEUTROPHILS 80 (H) 32 - 75 %    LYMPHOCYTES 8 (L) 12 - 49 %    MONOCYTES 11 5 - 13 %    EOSINOPHILS 0 0 - 7 %    BASOPHILS 0 0 - 1 %    IMMATURE GRANULOCYTES 1 (H) 0.0 - 0.5 %    ABS. NEUTROPHILS 9.8 (H) 1.8 - 8.0 K/UL    ABS. LYMPHOCYTES 1.0 0.8 - 3.5 K/UL    ABS. MONOCYTES 1.3 (H) 0.0 - 1.0 K/UL    ABS. EOSINOPHILS 0.0 0.0 - 0.4 K/UL    ABS. BASOPHILS 0.0 0.0 - 0.1 K/UL    ABS. IMM.  GRANS. 0.1 (H) 0.00 - 0.04 K/UL    DF AUTOMATED     METABOLIC PANEL, BASIC    Collection Time: 03/20/21  7:07 AM   Result Value Ref Range    Sodium 143 136 - 145 mmol/L    Potassium 3.7 3.5 - 5.1 mmol/L    Chloride 114 (H) 97 - 108 mmol/L    CO2 24 21 - 32 mmol/L    Anion gap 5 5 - 15 mmol/L    Glucose 106 (H) 65 - 100 mg/dL    BUN 14 6 - 20 MG/DL    Creatinine 0.60 0.55 - 1.02 MG/DL    BUN/Creatinine ratio 23 (H) 12 - 20      GFR est AA >60 >60 ml/min/1.73m2    GFR est non-AA >60 >60 ml/min/1.73m2    Calcium 8.5 8.5 - 10.1 MG/DL       Problem List:  Problem List as of 3/20/2021 Never Reviewed          Codes Class Noted - Resolved    [facility] ICD-10-CM: S72.009A  ICD-9-CM: 820.8  3/18/2021 - Present              Medications reviewed  Current Facility-Administered Medications   Medication Dose Route Frequency    traMADoL (ULTRAM) tablet 50 mg  50 mg Oral Q4H PRN    HYDROcodone-acetaminophen (NORCO) 5-325 mg per tablet 1 Tab  1 Tab Oral Q4H PRN    dorzolamide (TRUSOPT) 2 % ophthalmic solution 1 Drop  1 Drop Both Eyes TID    And    timolol (TIMOPTIC) 0.5 % ophthalmic solution 1 Drop  1 Drop Both Eyes BID    sodium chloride (NS) flush 5-40 mL  5-40 mL IntraVENous Q8H    sodium chloride (NS) flush 5-40 mL  5-40 mL IntraVENous PRN    naloxone (NARCAN) injection 0.4 mg  0.4 mg IntraVENous PRN    calcium-vitamin D (OS-KOSTAS +D3) 500 mg-200 unit per tablet 1 Tab  1 Tab Oral TID WITH MEALS    senna-docusate (PERICOLACE) 8.6-50 mg per tablet 1 Tab  1 Tab Oral BID    polyethylene glycol (MIRALAX) packet 17 g  17 g Oral DAILY    [START ON 3/21/2021] bisacodyL (DULCOLAX) suppository 10 mg  10 mg Rectal DAILY PRN    acetaminophen (TYLENOL) tablet 650 mg  650 mg Oral Q6H    enoxaparin (LOVENOX) injection 40 mg  40 mg SubCUTAneous DAILY    carbidopa-levodopa (SINEMET)  mg per tablet 1 Tab  1 Tab Oral TID    sertraline (ZOLOFT) tablet 50 mg  50 mg Oral QHS    LORazepam (ATIVAN) tablet 0.25 mg  0.25 mg Oral TID    latanoprost (XALATAN) 0.005 % ophthalmic solution 1 Drop  1 Drop Both Eyes QHS    ondansetron (ZOFRAN) injection 4 mg  4 mg IntraVENous Q4H PRN    lidocaine 4 % patch 1 Patch  1 Patch TransDERmal Q24H       Care Plan discussed with: Patient/family, nurse      Vasyl Zapien NP  Hospitalist  Providers can reach me on Perfect[employer]

## 2021-03-21 NOTE — PROGRESS NOTES
Hospitalist Progress Note          Maico Ornelas NP  Please call  and page for questions. Call physician on-call through the  7pm-7am    Daily Progress Note: 3/21/2021    Primary care provider:Anthony Herrmann MD    Date of admission: 3/18/2021 12:32 PM    Admission Summary:     From H&P 3/18/2021 12:32 PM:  Arline Jones is a 80 y.o. female who presents with after a fall  History is primary obtained from the patient and her daughter was present at the bedside  Patient reports that she fell this morning. Patient reports that her Rollator fell and she went down with her. Patient reports she did not hit her head. Patient reports that immediately she started having pain in her left hip. Patient got concerned and decided to come to the hospital.  Patient is found to have a left hip fracture and was requested to be admitted to the hospital service. Patient denies any other complaints or problems. The patient denies any Headache, blurry vision, sore throat, trouble swallowing, trouble with speech, chest pain, SOB, cough, fever, chills, N/V/D, abd pain, urinary symptoms, constipation, recent travels, sick contacts, focal or generalized neurological symptoms,, injuries, rashes, contact with COVID-19 diagnosed patients, hematemesis, melena, hemoptysis, hematuria, rashes, denies starting any new medications and denies any other concerns or problems besides as mentioned above. \"    Hospital Course:     03/19: Left IM hip nailing    03/21: Hgb 7.4 w/ dizziness, nausea, fatigue, low/normal BP (hgb 12 POA) Rec'd 1u PRBC    Subjective:   F/U on L hip fracture    Pt seen today in bed in [facility]. She is resting comfortably. Arouses easily and tells me she's having no pain. She is agreeable to receiving blood.     Assessment/Plan:       Left femoral fracture: s/p IM nailing (02/19)  -Resolved  -Pain control  -Monitor Hgb/BP  -PT/OT per ortho  -Appreciate ortho input    Abnormal UA: UTI ruled out  -Urine culture w/ no growth  -stop ceftriaxone    Dehydration  -Resolved w/ IVF  -Encourage PO intake    ABL Anemia  -Hgb POA 12.1-->10.1 this AM  -Hypotensive in PACU  -Monitor Hgb, transfuse for less than 7 or if hgb continues to drop and pt becomes symptomatic  -Monitor HH    Parkinsons: w/ intermittent confusion  -Stable, per daughter confusion started w/ COVID isolation and has increased some in last few months  -cont sinimet  -supportive care  -fall precuations  -judicial use of opiates    DVTppx: SCDs, lovenox  Gippx: NA  Code Status: Full code  Diet: Regular  Activity: OOB to chair TID and PRN  Discharge: Plan to discharge home w/ HH vs IPR pending progress; hopefully next 1-2 days          Review of Systems:     Full ROS complete with pertinent positives and negatives as per HPI, otherwise negative    Objective:   Physical Exam:     Visit Vitals  BP (!) 97/59 (BP 1 Location: Right upper arm)   Pulse 65   Temp 97 °F (36.1 °C)   Resp 12   Ht 5' 5\" (1.651 m)   Wt 54.9 kg (121 lb)   SpO2 93%   BMI 20.14 kg/m²    O2 Flow Rate (L/min): 2 l/min O2 Device: Room air    Temp (24hrs), Av.5 °F (36.4 °C), Min:97 °F (36.1 °C), Max:97.8 °F (36.6 °C)    No intake/output data recorded.  1901 -  0700  In: 480 [P.O.:480]  Out: 1200 [Urine:1200]      General:  Alert, cooperative, no distress, appears stated age. Lungs:   Clear to auscultation bilaterally. Heart:  Regular rate and rhythm, S1, S2 normal, no murmur, click, rub or gallop. Abdomen:   Soft, non-tender, non-distended. Bowel sounds normal.    Extremities: Extremities normal, atraumatic, no cyanosis or edema. Left hip dressing c/d/i   Skin: Skin color, texture, turgor normal. No rashes or lesions   Neurologic: CNII-XII intact. Equal Strength. ANGULO.  A&Ox2-3 w/ intermittent confusion     Data Review:       Recent Days:  Recent Labs     21  0645 21  0707 21  0229   WBC 8.9 12.1* 9.1   HGB 7.4* 8.6* 10.1*   HCT 23.4* 28.1* 32.1*    174 213     Recent Labs     03/21/21  0645 03/20/21  0707 03/19/21  0229 03/18/21  1801 03/18/21  1318    143 141  --  136   K 3.8 3.7 4.0  --  4.0   * 114* 111*  --  109*   CO2 26 24 26  --  23   * 106* 106*  --  121*   BUN 17 14 18  --  22*   CREA 0.55 0.60 0.56  --  0.80   CA 8.6 8.5 8.6  --  9.0   ALB  --   --  3.1*  --  3.7   ALT  --   --  18  --  24   INR  --   --   --  1.1  --      No results for input(s): PH, PCO2, PO2, HCO3, FIO2 in the last 72 hours. 24 Hour Results:  Recent Results (from the past 24 hour(s))   CBC WITH AUTOMATED DIFF    Collection Time: 03/21/21  6:45 AM   Result Value Ref Range    WBC 8.9 3.6 - 11.0 K/uL    RBC 2.40 (L) 3.80 - 5.20 M/uL    HGB 7.4 (L) 11.5 - 16.0 g/dL    HCT 23.4 (L) 35.0 - 47.0 %    MCV 97.5 80.0 - 99.0 FL    MCH 30.8 26.0 - 34.0 PG    MCHC 31.6 30.0 - 36.5 g/dL    RDW 13.1 11.5 - 14.5 %    PLATELET 526 084 - 474 K/uL    MPV 10.9 8.9 - 12.9 FL    NRBC 0.0 0  WBC    ABSOLUTE NRBC 0.00 0.00 - 0.01 K/uL    NEUTROPHILS 76 (H) 32 - 75 %    LYMPHOCYTES 10 (L) 12 - 49 %    MONOCYTES 10 5 - 13 %    EOSINOPHILS 3 0 - 7 %    BASOPHILS 0 0 - 1 %    IMMATURE GRANULOCYTES 1 (H) 0.0 - 0.5 %    ABS. NEUTROPHILS 6.7 1.8 - 8.0 K/UL    ABS. LYMPHOCYTES 0.9 0.8 - 3.5 K/UL    ABS. MONOCYTES 0.9 0.0 - 1.0 K/UL    ABS. EOSINOPHILS 0.3 0.0 - 0.4 K/UL    ABS. BASOPHILS 0.0 0.0 - 0.1 K/UL    ABS. IMM.  GRANS. 0.1 (H) 0.00 - 0.04 K/UL    DF AUTOMATED     METABOLIC PANEL, BASIC    Collection Time: 03/21/21  6:45 AM   Result Value Ref Range    Sodium 140 136 - 145 mmol/L    Potassium 3.8 3.5 - 5.1 mmol/L    Chloride 109 (H) 97 - 108 mmol/L    CO2 26 21 - 32 mmol/L    Anion gap 5 5 - 15 mmol/L    Glucose 104 (H) 65 - 100 mg/dL    BUN 17 6 - 20 MG/DL    Creatinine 0.55 0.55 - 1.02 MG/DL    BUN/Creatinine ratio 31 (H) 12 - 20      GFR est AA >60 >60 ml/min/1.73m2    GFR est non-AA >60 >60 ml/min/1.73m2    Calcium 8.6 8.5 - 10.1 MG/DL       Problem List:  Problem List as of 3/21/2021 Never Reviewed          Codes Class Noted - Resolved    Hip fracture [facility]) ICD-10-CM: S72.009A  ICD-9-CM: 820.8  3/18/2021 - Present              Medications reviewed  Current Facility-Administered Medications   Medication Dose Route Frequency    0.9% sodium chloride infusion 250 mL  250 mL IntraVENous PRN    traMADoL (ULTRAM) tablet 50 mg  50 mg Oral Q4H PRN    HYDROcodone-acetaminophen (NORCO) 5-325 mg per tablet 1 Tab  1 Tab Oral Q4H PRN    dorzolamide (TRUSOPT) 2 % ophthalmic solution 1 Drop  1 Drop Both Eyes TID    And    timolol (TIMOPTIC) 0.5 % ophthalmic solution 1 Drop  1 Drop Both Eyes BID    sodium chloride (NS) flush 5-40 mL  5-40 mL IntraVENous Q8H    sodium chloride (NS) flush 5-40 mL  5-40 mL IntraVENous PRN    naloxone (NARCAN) injection 0.4 mg  0.4 mg IntraVENous PRN    calcium-vitamin D (OS-KOSTAS +D3) 500 mg-200 unit per tablet 1 Tab  1 Tab Oral TID WITH MEALS    senna-docusate (PERICOLACE) 8.6-50 mg per tablet 1 Tab  1 Tab Oral BID    polyethylene glycol (MIRALAX) packet 17 g  17 g Oral DAILY    bisacodyL (DULCOLAX) suppository 10 mg  10 mg Rectal DAILY PRN    acetaminophen (TYLENOL) tablet 650 mg  650 mg Oral Q6H    enoxaparin (LOVENOX) injection 40 mg  40 mg SubCUTAneous DAILY    carbidopa-levodopa (SINEMET)  mg per tablet 1 Tab  1 Tab Oral TID    sertraline (ZOLOFT) tablet 50 mg  50 mg Oral QHS    latanoprost (XALATAN) 0.005 % ophthalmic solution 1 Drop  1 Drop Both Eyes QHS    ondansetron (ZOFRAN) injection 4 mg  4 mg IntraVENous Q4H PRN    lidocaine 4 % patch 1 Patch  1 Patch TransDERmal Q24H       Care Plan discussed with: Patient/family, nurse      Carly Mckeon NP  Hospitalist  Providers can reach me on Perfect[employer]

## 2021-03-21 NOTE — PROGRESS NOTES
Problem: Mobility Impaired (Adult and Pediatric)  Goal: *Acute Goals and Plan of Care (Insert Text)  Description: FUNCTIONAL STATUS PRIOR TO ADMISSION: Patient was modified independent using a rollator for functional mobility. HOME SUPPORT PRIOR TO ADMISSION: The patient lived in assisted living at [facility]. Physical Therapy Goals  Initiated 3/20/2021  1. Patient will move from supine to sit and sit to supine  in bed with supervision/set-up within 7 day(s). 2.  Patient will transfer from bed to chair and chair to bed with minimal assistance/contact guard assist using the least restrictive device within 7 day(s). 3.  Patient will perform sit to stand with minimal assistance/contact guard assist within 7 day(s). 4.  Patient will ambulate with minimal assistance/contact guard assist for 50 feet with the least restrictive device within 7 day(s). Outcome: Progressing Towards Goal   PHYSICAL THERAPY TREATMENT  Patient: Lalo Miller (76 y.o. female)  Date: 3/21/2021  Diagnosis: Hip fracture ([facility] [S72.009A] <principal problem not specified>  Procedure(s) (LRB):  LEFT FEMUR INSERTION INTRA MEDULLARY NAIL  ESSENTIAL (Left) 2 Days Post-Op  Precautions: WBAT  Chart, physical therapy assessment, plan of care and goals were reviewed. ASSESSMENT  Patient continues with skilled PT services and is progressing towards goals. Patient is complaining of pain, but is alert and co-operative. Daughter present for session, confirms that patient is in retirement at [facility]. States that patient frequently states that she is dizzy. Current Level of Function Impacting Discharge (mobility/balance): Performed bed mobility and transfers with moderate assistance of 2, tends to lean back especially with initial stand, corrects self intermittently with rhythmic stabilization by PT.  Maximal Assistance of 2 to take steps with RW and gait belt from bed to bed side commode, then bed side commode to chair. Left up in chair for lunch, then will return to bed at second PT session today. Other factors to consider for discharge: Not at (supervised assistance with rollator) baseline/pain/confusion         PLAN :  Patient continues to benefit from skilled intervention to address the above impairments. Continue treatment per established plan of care. to address goals. Recommendation for discharge: (in order for the patient to meet his/her long term goals)  Therapy up to 5 days/week in SNF setting    This discharge recommendation:  Has been made in collaboration with the attending provider and/or case management    IF patient discharges home will need the following DME: rolling walker-will not order if patient goes to rehab       SUBJECTIVE:   Patient stated This hurts so bad, and I am going to fall. I am dizzy    OBJECTIVE DATA SUMMARY:   Critical Behavior:  Neurologic State: Alert, Confused  Orientation Level: Oriented to person  Cognition: Decreased attention/concentration, Decreased command following, Impaired decision making  Safety/Judgement: Decreased awareness of environment, Decreased awareness of need for assistance, Decreased awareness of need for safety, Decreased insight into deficits  Functional Mobility Training:  Bed Mobility:     Supine to Sit: Minimum assistance; Moderate assistance;Assist x2;Bed Modified  Sit to Supine: (Left up in chair at bed side)           Transfers:  Sit to Stand: Moderate assistance;Assist x2  Stand to Sit: Moderate assistance;Assist x2        Bed to Chair: Moderate assistance;Assist x2                    Balance:  Sitting: Impaired; Without support  Sitting - Static: Fair (occasional)  Sitting - Dynamic: Poor (constant support)  Standing: Impaired; With support  Standing - Static: Fair;Poor;Constant support  Standing - Dynamic : Poor;Constant support  Ambulation/Gait Training:  Distance (ft): 3 Feet (ft)  Assistive Device: Walker, rolling;Gait belt  Ambulation - Level of Assistance: Maximum assistance;Assist x2        Gait Abnormalities: Antalgic;Decreased step clearance;Shuffling gait;Trunk sway increased     Left Side Weight Bearing: As tolerated  Base of Support: Shift to right  Stance: Left decreased  Speed/Cassie: Pace decreased (<100 feet/min); Shuffled  Step Length: Right shortened;Left shortened        Interventions: Safety awareness training;Verbal cues; Tactile cues       Pain Rating:  Unable to give a number, but crying out and complaining of pain with all movement. Activity Tolerance:   Fair    After treatment patient left in no apparent distress:   Sitting in chair, Call bell within reach, Bed / chair alarm activated, Caregiver / family present, and nurse notified. COMMUNICATION/COLLABORATION:   The patients plan of care was discussed with: Registered nurse and Rehabilitation technician.      Daniel Pascual   Time Calculation: 42 mins

## 2021-03-21 NOTE — PROGRESS NOTES
Problem: Mobility Impaired (Adult and Pediatric)  Goal: *Acute Goals and Plan of Care (Insert Text)  Description: FUNCTIONAL STATUS PRIOR TO ADMISSION: Patient was modified independent using a rollator for functional mobility. HOME SUPPORT PRIOR TO ADMISSION: The patient lived in assisted living at [facility]. Physical Therapy Goals  Initiated 3/20/2021  1. Patient will move from supine to sit and sit to supine  in bed with supervision/set-up within 7 day(s). 2.  Patient will transfer from bed to chair and chair to bed with minimal assistance/contact guard assist using the least restrictive device within 7 day(s). 3.  Patient will perform sit to stand with minimal assistance/contact guard assist within 7 day(s). 4.  Patient will ambulate with minimal assistance/contact guard assist for 50 feet with the least restrictive device within 7 day(s).       3/21/2021 1507 by Etta HANSON  Outcome: Progressing Towards Goal   PHYSICAL THERAPY TREATMENT  Patient: Roxann Cowden (65 y.o. female)  Date: 3/21/2021  Diagnosis: Hip fracture ([facility] [address] [S72.009A] <principal problem not specified>  Procedure(s) (LRB):  LEFT FEMUR INSERTION INTRA MEDULLARY NAIL  ESSENTIAL (Left) 2 Days Post-Op  Precautions: WBAT  Chart, physical therapy assessment, plan of care and goals were reviewed. ASSESSMENT  Patient continues with skilled PT services and is progressing towards goals. Patient tolerated up in chair comfortably for 2 hours, looking at magazines, etc.     Current Level of Function Impacting Discharge (mobility/balance): Performed transfer from chair to bed with moderate assistance of 2, required maximal assistance for supine-sit and scooting up in bed. Other factors to consider for discharge: Modified independent with supervision assistance in SARA/confused/high pain levels         PLAN :  Patient continues to benefit from skilled intervention to address the above impairments.   Continue treatment per established plan of care. to address goals. Recommendation for discharge: (in order for the patient to meet his/her long term goals)  Therapy up to 5 days/week in SNF setting    This discharge recommendation:  A follow-up discussion with the attending provider and/or case management is planned    IF patient discharges home will need the following DME: rolling walker and wheelchair       SUBJECTIVE:   Patient stated I can't eat lying down.     OBJECTIVE DATA SUMMARY:   Critical Behavior:  Neurologic State: Alert, Confused  Orientation Level: Oriented to person  Cognition: Decreased command following  Safety/Judgement: Decreased awareness of environment, Decreased awareness of need for assistance, Decreased awareness of need for safety, Decreased insight into deficits  Functional Mobility Training:  Bed Mobility:     Supine to Sit: Minimum assistance; Moderate assistance;Assist x2;Bed Modified  Sit to Supine: Maximum assistance;Assist x2  Scooting: Maximum assistance;Assist x2        Transfers:  Sit to Stand: Moderate assistance;Assist x2  Stand to Sit: Moderate assistance;Assist x2        Bed to Chair: Moderate assistance;Assist x2(chair to bed)                    Balance:  Sitting: Impaired; Without support  Sitting - Static: Fair (occasional)  Sitting - Dynamic: Poor (constant support)  Standing: Impaired; With support  Standing - Static: Fair;Poor;Constant support  Standing - Dynamic : Poor;Constant support  Ambulation/Gait Training:  Distance (ft): 2 Feet (ft)  Assistive Device: Walker, rolling;Gait belt  Ambulation - Level of Assistance: Maximum assistance;Assist x2        Gait Abnormalities: Antalgic;Decreased step clearance;Shuffling gait;Trunk sway increased     Left Side Weight Bearing: As tolerated  Base of Support: Shift to right  Stance: Left decreased  Speed/Cassie: Pace decreased (<100 feet/min)  Step Length: Right shortened;Left shortened        Interventions: Safety awareness training;Verbal cues Pain Rating: It hurts when you move it and when I stand on it. Activity Tolerance:   Fair    After treatment patient left in no apparent distress:   Supine in bed, Call bell within reach, Side rails x 3, and nurse notified. COMMUNICATION/COLLABORATION:   The patients plan of care was discussed with: Registered nurse.      Kelly Duarte   Time Calculation: 30 mins

## 2021-03-21 NOTE — PROGRESS NOTES
Ortho Daily Progress Note      Patient: Sinai Bridges                   MRN: [id number]  Sex: female  YOB: 1931           Age: 80 y.o.    2 Days Post-Op    Procedure(s): LEFT FEMUR INSERTION INTRA MEDULLARY NAIL      Subjective: Left hip sore, making progress with therapy     Visit Vitals  BP (!) 97/59 (BP 1 Location: Right upper arm)   Pulse 65   Temp 97 °F (36.1 °C)   Resp 12   Ht 5' 5\" (1.651 m)   Wt 54.9 kg (121 lb)   SpO2 93%   BMI 20.14 kg/m²        Lab Results:  HGB   Date/Time Value Ref Range Status   03/21/2021 06:45 AM 7.4 (L) 11.5 - 16.0 g/dL Final     INR   Date/Time Value Ref Range Status   03/18/2021 06:01 PM 1.1 0.9 - 1.1   Final     Comment:     A single therapeutic range for Vit K antagonists may not be optimal for all indications - see June, 2008 issue of Chest, American College of Chest Physicians Evidence-Based Clinical Practice Guidelines, 8th Edition.        Physical Exam:    GENERAL: 79yo female, alert, cooperative, no distress, daughter at bedside  DRESSING: clean/dry  SWELLING: mild  NEUROLOGICAL: intact  PULSE:yes   GENERAL: alert, cooperative, no distress, appears stated age  MOTION: minimal pain with PROM left hip  DVT Exam: No evidence of DVT seen on physical exam.      Plan:    DVT prophylaxis: Lovenox injections 40mg sub q once daily until 10 days post-op  Weight bearing restriction WBAT  Pain Control:stable, mild-to-moderate joint symptoms intermittently, reasonably well controlled by current meds  Dispo: anticipate SNF placement (healthcare unit at [facility])  Follow-up with Dr. Maddy Bravo in 1 month    [address] [place] [address]  3/21/2021   12:05 PM      .

## 2021-03-21 NOTE — ROUTINE PROCESS
Bedside and Verbal shift change report given to [address] [place], [place] [address] (oncoming nurse) by Angelica Blanco (offgoing nurse). Report included the following information SBAR, Kardex and Recent Results.

## 2021-03-22 LAB
ABO + RH BLD: NORMAL
ANION GAP SERPL CALC-SCNC: 3 MMOL/L (ref 5–15)
BASOPHILS # BLD: 0.1 K/UL (ref 0–0.1)
BASOPHILS NFR BLD: 1 % (ref 0–1)
BLD PROD TYP BPU: NORMAL
BLOOD GROUP ANTIBODIES SERPL: NORMAL
BPU ID: NORMAL
BUN SERPL-MCNC: 16 MG/DL (ref 6–20)
BUN/CREAT SERPL: 26 (ref 12–20)
CALCIUM SERPL-MCNC: 9.1 MG/DL (ref 8.5–10.1)
CHLORIDE SERPL-SCNC: 107 MMOL/L (ref 97–108)
CO2 SERPL-SCNC: 28 MMOL/L (ref 21–32)
CREAT SERPL-MCNC: 0.61 MG/DL (ref 0.55–1.02)
CROSSMATCH RESULT,%XM: NORMAL
DIFFERENTIAL METHOD BLD: ABNORMAL
EOSINOPHIL # BLD: 0.3 K/UL (ref 0–0.4)
EOSINOPHIL NFR BLD: 3 % (ref 0–7)
ERYTHROCYTE [DISTWIDTH] IN BLOOD BY AUTOMATED COUNT: 14.9 % (ref 11.5–14.5)
GLUCOSE SERPL-MCNC: 101 MG/DL (ref 65–100)
HCT VFR BLD AUTO: 30.8 % (ref 35–47)
HGB BLD-MCNC: 9.8 G/DL (ref 11.5–16)
IMM GRANULOCYTES # BLD AUTO: 0.1 K/UL (ref 0–0.04)
IMM GRANULOCYTES NFR BLD AUTO: 2 % (ref 0–0.5)
LYMPHOCYTES # BLD: 1.2 K/UL (ref 0.8–3.5)
LYMPHOCYTES NFR BLD: 13 % (ref 12–49)
MCH RBC QN AUTO: 30.4 PG (ref 26–34)
MCHC RBC AUTO-ENTMCNC: 31.8 G/DL (ref 30–36.5)
MCV RBC AUTO: 95.7 FL (ref 80–99)
MONOCYTES # BLD: 1.2 K/UL (ref 0–1)
MONOCYTES NFR BLD: 13 % (ref 5–13)
NEUTS SEG # BLD: 6.2 K/UL (ref 1.8–8)
NEUTS SEG NFR BLD: 68 % (ref 32–75)
NRBC # BLD: 0 K/UL (ref 0–0.01)
NRBC BLD-RTO: 0 PER 100 WBC
PLATELET # BLD AUTO: 181 K/UL (ref 150–400)
PMV BLD AUTO: 11.2 FL (ref 8.9–12.9)
POTASSIUM SERPL-SCNC: 4.3 MMOL/L (ref 3.5–5.1)
RBC # BLD AUTO: 3.22 M/UL (ref 3.8–5.2)
SODIUM SERPL-SCNC: 138 MMOL/L (ref 136–145)
SPECIMEN EXP DATE BLD: NORMAL
STATUS OF UNIT,%ST: NORMAL
UNIT DIVISION, %UDIV: 0
WBC # BLD AUTO: 9 K/UL (ref 3.6–11)

## 2021-03-22 PROCEDURE — 85025 COMPLETE CBC W/AUTO DIFF WBC: CPT

## 2021-03-22 PROCEDURE — 65270000029 HC RM PRIVATE

## 2021-03-22 PROCEDURE — 74011250637 HC RX REV CODE- 250/637: Performed by: NURSE PRACTITIONER

## 2021-03-22 PROCEDURE — 80048 BASIC METABOLIC PNL TOTAL CA: CPT

## 2021-03-22 PROCEDURE — 97535 SELF CARE MNGMENT TRAINING: CPT

## 2021-03-22 PROCEDURE — 36415 COLL VENOUS BLD VENIPUNCTURE: CPT

## 2021-03-22 PROCEDURE — 51798 US URINE CAPACITY MEASURE: CPT

## 2021-03-22 PROCEDURE — 97530 THERAPEUTIC ACTIVITIES: CPT

## 2021-03-22 PROCEDURE — 74011250637 HC RX REV CODE- 250/637: Performed by: FAMILY MEDICINE

## 2021-03-22 PROCEDURE — 74011000250 HC RX REV CODE- 250: Performed by: ORTHOPAEDIC SURGERY

## 2021-03-22 PROCEDURE — 74011250637 HC RX REV CODE- 250/637: Performed by: ORTHOPAEDIC SURGERY

## 2021-03-22 PROCEDURE — 74011250636 HC RX REV CODE- 250/636: Performed by: ORTHOPAEDIC SURGERY

## 2021-03-22 RX ADMIN — Medication 1 TABLET: at 12:12

## 2021-03-22 RX ADMIN — ENOXAPARIN SODIUM 40 MG: 40 INJECTION SUBCUTANEOUS at 08:55

## 2021-03-22 RX ADMIN — ACETAMINOPHEN 650 MG: 325 TABLET ORAL at 17:22

## 2021-03-22 RX ADMIN — DORZOLAMIDE HYDROCHLORIDE 1 DROP: 20 SOLUTION/ DROPS OPHTHALMIC at 16:00

## 2021-03-22 RX ADMIN — ACETAMINOPHEN 650 MG: 325 TABLET ORAL at 05:36

## 2021-03-22 RX ADMIN — TRAMADOL HYDROCHLORIDE 50 MG: 50 TABLET, COATED ORAL at 21:24

## 2021-03-22 RX ADMIN — CARBIDOPA AND LEVODOPA 1 TABLET: 25; 100 TABLET ORAL at 21:21

## 2021-03-22 RX ADMIN — DOCUSATE SODIUM 50 MG AND SENNOSIDES 8.6 MG 1 TABLET: 8.6; 5 TABLET, FILM COATED ORAL at 08:54

## 2021-03-22 RX ADMIN — CARBIDOPA AND LEVODOPA 1 TABLET: 25; 100 TABLET ORAL at 15:23

## 2021-03-22 RX ADMIN — Medication 10 ML: at 00:00

## 2021-03-22 RX ADMIN — TRAMADOL HYDROCHLORIDE 50 MG: 50 TABLET, COATED ORAL at 07:16

## 2021-03-22 RX ADMIN — POLYETHYLENE GLYCOL 3350 17 G: 17 POWDER, FOR SOLUTION ORAL at 08:55

## 2021-03-22 RX ADMIN — LATANOPROST 1 DROP: 50 SOLUTION OPHTHALMIC at 21:40

## 2021-03-22 RX ADMIN — Medication 5 ML: at 22:00

## 2021-03-22 RX ADMIN — CARBIDOPA AND LEVODOPA 1 TABLET: 25; 100 TABLET ORAL at 07:16

## 2021-03-22 RX ADMIN — DORZOLAMIDE HYDROCHLORIDE 1 DROP: 20 SOLUTION/ DROPS OPHTHALMIC at 21:41

## 2021-03-22 RX ADMIN — DORZOLAMIDE HYDROCHLORIDE 1 DROP: 20 SOLUTION/ DROPS OPHTHALMIC at 09:00

## 2021-03-22 RX ADMIN — HYDROCODONE BITARTRATE AND ACETAMINOPHEN 1 TABLET: 5; 325 TABLET ORAL at 12:12

## 2021-03-22 RX ADMIN — Medication 10 ML: at 05:37

## 2021-03-22 RX ADMIN — TIMOLOL MALEATE 1 DROP: 5 SOLUTION OPHTHALMIC at 09:00

## 2021-03-22 RX ADMIN — ACETAMINOPHEN 650 MG: 325 TABLET ORAL at 00:00

## 2021-03-22 RX ADMIN — TIMOLOL MALEATE 1 DROP: 5 SOLUTION OPHTHALMIC at 18:00

## 2021-03-22 RX ADMIN — Medication 1 TABLET: at 17:22

## 2021-03-22 RX ADMIN — DOCUSATE SODIUM 50 MG AND SENNOSIDES 8.6 MG 1 TABLET: 8.6; 5 TABLET, FILM COATED ORAL at 17:22

## 2021-03-22 RX ADMIN — SERTRALINE 50 MG: 50 TABLET, FILM COATED ORAL at 21:21

## 2021-03-22 RX ADMIN — Medication 1 TABLET: at 08:54

## 2021-03-22 NOTE — PROGRESS NOTES
EMERALD: Plan for discharge to [facility] healthcare unit when medically stable. Insurance etrigg was started yesterday by the facility. This facility does not require covid testing. BLS transport at discharge. Chart reviewed. CM received call from Maria G Boyle at [id number] [phone]. They need clinicals faxed to [phone]. CM sent fax. CM updated patient and daughter at bedside.     ELVIS QuintanaW/CRM

## 2021-03-22 NOTE — PROGRESS NOTES
Problem: Mobility Impaired (Adult and Pediatric)  Goal: *Acute Goals and Plan of Care (Insert Text)  Description: FUNCTIONAL STATUS PRIOR TO ADMISSION: Patient was modified independent using a rollator for functional mobility. HOME SUPPORT PRIOR TO ADMISSION: The patient lived in assisted living at [facility]. Physical Therapy Goals  Initiated 3/20/2021  1. Patient will move from supine to sit and sit to supine  in bed with supervision/set-up within 7 day(s). 2.  Patient will transfer from bed to chair and chair to bed with minimal assistance/contact guard assist using the least restrictive device within 7 day(s). 3.  Patient will perform sit to stand with minimal assistance/contact guard assist within 7 day(s). 4.  Patient will ambulate with minimal assistance/contact guard assist for 50 feet with the least restrictive device within 7 day(s). Outcome: Progressing Towards Goal    PHYSICAL THERAPY TREATMENT  Patient: Fede Vergara (4 y.o. female)  Date: 3/22/2021  Diagnosis: Hip fracture ([facility] [address] [S72.009A] <principal problem not specified>  Procedure(s) (LRB):  LEFT FEMUR INSERTION INTRA MEDULLARY NAIL  ESSENTIAL (Left) 3 Days Post-Op  Precautions: WBAT  Chart, physical therapy assessment, plan of care and goals were reviewed. ASSESSMENT  Patient continues with skilled PT services and is progressing towards goals. Biggest limiting factor continues to be pain and anxiety. Patient needs max encouragement to mobilize, but once she is up she needs min assist x 2 to transfer with RW. Patient transferred bed to commode and then to a bedside chair, taking approx 15 small steps with RW to get there. Biggest assist needed with bed mobility. Patient will be able to initiate forward gait training once mentally prepared. Patient is significantly below functional baseline and will most likely benefit from rehab upon discharge.        Current Level of Function Impacting Discharge (mobility/balance): max assist x 2 for bed mobility    Other factors to consider for discharge: previously mod I with rollator, anxety          PLAN :  Patient continues to benefit from skilled intervention to address the above impairments. Continue treatment per established plan of care. to address goals. Recommendation for discharge: (in order for the patient to meet his/her long term goals)  Therapy up to 5 days/week in SNF setting    This discharge recommendation:  Has been made in collaboration with the attending provider and/or case management    IF patient discharges home will need the following DME: to be determined (TBD)       SUBJECTIVE:   Patient stated You can't leave me here!.  (needs max encouragement to sit up in the chair)    OBJECTIVE DATA SUMMARY:   Critical Behavior:  Neurologic State: Alert  Orientation Level: Oriented to person, Oriented to place, Oriented to situation  Cognition: Decreased attention/concentration, Follows commands  Safety/Judgement: Awareness of environment  Functional Mobility Training:  Bed Mobility:  Sit to Supine: Maximum assistance;Assist x2;Bed Modified; Additional time; Adaptive equipment  Scooting: Maximum assistance;Assist x1     Transfers:  Sit to Stand: Moderate assistance;Assist x2;Adaptive equipment  Stand to Sit: Moderate assistance;Assist x2; Additional time; Adaptive equipment  Bed to Chair: Minimum assistance;Assist x2; Additional time; Adaptive equipment    Balance:  Sitting: Impaired; Without support  Sitting - Static: Fair (occasional)  Sitting - Dynamic: Poor (constant support)  Standing: Impaired; With support  Standing - Static: Fair;Constant support  Standing - Dynamic : Fair;Constant support    Therapeutic Exercises:   APs    Pain Ratin-10/10 L hip and neck.  \"Pain everywhere\"    Activity Tolerance:   Fair, SpO2 stable on RA, and requires frequent rest breaks    After treatment patient left in no apparent distress:   Sitting in chair, Call bell within reach, and Bed / chair alarm activated    COMMUNICATION/COLLABORATION:   The patients plan of care was discussed with: Occupational therapist and Registered nurse.      Del Osborn, PT, DPT  Geriatric Clinical Specialist     Time Calculation: 36 mins

## 2021-03-22 NOTE — PROGRESS NOTES
Problem: Self Care Deficits Care Plan (Adult)  Goal: *Acute Goals and Plan of Care (Insert Text)  Description:   FUNCTIONAL STATUS PRIOR TO ADMISSION: Patient was modified independent using a rollator for functional mobility, lives at [address], and per patient report, was was modified independent for basic and instrumental ADLs. HOME SUPPORT: Admitted from [facility], reports she was living in [address] [place] [address] but not confirmed d/t confusion    Occupational Therapy Goals  Initiated 3/20/2021  1. Patient will perform standing grooming x2 minutes with minimal assistance within 7 day(s). 2.  Patient will perform bathing with minimal assistance & AE PRN within 7 day(s). 3.  Patient will perform lower body dressing with minimal assistance & AE PRN within 7 day(s). 4.  Patient will perform toilet transfers with minimal assistance within 7 day(s). 5.  Patient will perform all aspects of toileting with minimal assistance within 7 day(s). 6.  Patient will participate in upper extremity therapeutic exercise/activities with supervision/set-up for 5 minutes within 7 day(s). 7.  Patient will utilize energy conservation techniques during functional activities with verbal and visual cues within 7 day(s). Outcome: Progressing Towards Goal   OCCUPATIONAL THERAPY TREATMENT  Patient: Teena Elliott (68 y.o. female)  Date: 3/22/2021  Diagnosis: Hip fracture ([facility] [address] [S72.009A] <principal problem not specified>  Procedure(s) (LRB):  LEFT FEMUR INSERTION INTRA MEDULLARY NAIL  ESSENTIAL (Left) 3 Days Post-Op  Precautions: WBAT  Chart, occupational therapy assessment, plan of care, and goals were reviewed. ASSESSMENT  Patient continues with skilled OT services and is progressing towards goals. Participation impacted by left hip pain with mobility (nurse notified), impaired strength and endurance for functional mobility, impaired reach  after left hip fracture and ORIF.        Current Level of Function Impacting Discharge (ADLs): Total assist for LE self care, toileting transfer [facility] with mod assist of 2 and RW    Other factors to consider for discharge: From [facility]         PLAN :  Patient continues to benefit from skilled intervention to address the above impairments. Continue treatment per established plan of care. to address goals. Recommend with staff: Suinlsmitha Niceri in chair with min to max of 2 and RW, BSC transfer with RW and min to mod assist of 2 and total assist for hygiene and clothing, total assist for LE self care    Recommend next OT session: LE self care, BSC    Recommendation for discharge: (in order for the patient to meet his/her long term goals)  Therapy up to 5 days/week in SNF setting    This discharge recommendation:  Has been made in collaboration with the attending provider and/or case management    IF patient discharges home will need the following DME: bedside commode, hospital bed, walker: rolling, and wheelchair       SUBJECTIVE:   Patient stated I can't do that.  spoken before completing each step with assist    OBJECTIVE DATA SUMMARY:   Cognitive/Behavioral Status:  Neurologic State: Alert  Orientation Level: Oriented to person;Oriented to place;Oriented to situation  Cognition: Decreased attention/concentration; Follows commands  Perception: Appears intact  Perseveration: No perseveration noted  Safety/Judgement: Awareness of environment    Functional Mobility and Transfers for ADLs:  Bed Mobility:  Sit to Supine: Maximum assistance;Assist x2;Bed Modified; Additional time; Adaptive equipment  Scooting: Maximum assistance;Assist x1    Transfers:  Sit to Stand: Moderate assistance;Assist x2;Adaptive equipment  Functional Transfers  Toilet Transfer : Moderate assistance;Assist x2; Additional time; Adaptive equipment  Cues: Physical assistance;Verbal cues provided; Tactile cues provided  Adaptive Equipment: Walker (comment); Bedside commode  Bed to Chair: Minimum assistance;Assist x2; Additional time; Adaptive equipment    Balance:  Sitting: Impaired; Without support  Sitting - Static: Fair (occasional)  Sitting - Dynamic: Poor (constant support)  Standing: Impaired; With support  Standing - Static: Fair;Constant support  Standing - Dynamic : Fair;Constant support    ADL Intervention:        Lower Body Dressing Assistance  Dressing Assistance: Total assistance(dependent)  Protective Undergarmet: Total assistance (dependent)  Socks: Total assistance (dependent)  Leg Crossed Method Used: No  Position Performed: Standing;Seated in chair;Seated edge of bed  Cues: Physical assistance; Tactile cues provided;Verbal cues provided;Visual cues provided  Adaptive Equipment Used: Walker    Toileting  Toileting Assistance: Total assistance(dependent)  Bladder Hygiene: Total assistance (dependent)  Bowel Hygiene: Total assistance (dependent)  Clothing Management: Total assistance (dependent)  Cues: Physical assistance for pants up;Physical assistance for pants down; Tactile cues provided;Verbal cues provided  Adaptive Equipment: Walker    Cognitive Retraining  Organizing/Sequencing: Breaking task down  Attention to Task: Single task  Following Commands: Follows one step commands/directions  Safety/Judgement: Awareness of environment  Cues: Verbal cues provided;Visual cues provided; Tactile cues provided      Activity Tolerance:   Fair    After treatment patient left in no apparent distress:   Sitting in chair, Call bell within reach, and Bed / chair alarm activated    COMMUNICATION/COLLABORATION:   The patients plan of care was discussed with: Physical therapist and Registered nurse.      SHELDON Zhang  Time Calculation: 29 mins

## 2021-03-22 NOTE — PROGRESS NOTES
Ortho Daily Progress Note    3/22/2021    POD:  3 Days Post-Op  S/P:  Procedure(s):  Left femur intramedullary nail      HPI: 81 yo F that is POD3, s/p  left femur intramedullary nail with Dr. Edwin Miller on 3/19/2021. She reports pain into the left hip with therapy. She just finished her session with PT during our encounter, so she was complaining of moderate pain with left leg movement. She states when she lies still she feels no pain. He has no other orthopedic complaints. He denies paresthesias, SOB, fever, chills. LABS:  Lab Results   Component Value Date/Time    HGB 9.8 (L) 03/22/2021 05:28 AM    INR 1.1 03/18/2021 06:01 PM     Recent Results (from the past 12 hour(s))   CBC WITH AUTOMATED DIFF    Collection Time: 03/22/21  5:28 AM   Result Value Ref Range    WBC 9.0 3.6 - 11.0 K/uL    RBC 3.22 (L) 3.80 - 5.20 M/uL    HGB 9.8 (L) 11.5 - 16.0 g/dL    HCT 30.8 (L) 35.0 - 47.0 %    MCV 95.7 80.0 - 99.0 FL    MCH 30.4 26.0 - 34.0 PG    MCHC 31.8 30.0 - 36.5 g/dL    RDW 14.9 (H) 11.5 - 14.5 %    PLATELET 422 164 - 686 K/uL    MPV 11.2 8.9 - 12.9 FL    NRBC 0.0 0  WBC    ABSOLUTE NRBC 0.00 0.00 - 0.01 K/uL    NEUTROPHILS 68 32 - 75 %    LYMPHOCYTES 13 12 - 49 %    MONOCYTES 13 5 - 13 %    EOSINOPHILS 3 0 - 7 %    BASOPHILS 1 0 - 1 %    IMMATURE GRANULOCYTES 2 (H) 0.0 - 0.5 %    ABS. NEUTROPHILS 6.2 1.8 - 8.0 K/UL    ABS. LYMPHOCYTES 1.2 0.8 - 3.5 K/UL    ABS. MONOCYTES 1.2 (H) 0.0 - 1.0 K/UL    ABS. EOSINOPHILS 0.3 0.0 - 0.4 K/UL    ABS. BASOPHILS 0.1 0.0 - 0.1 K/UL    ABS. IMM.  GRANS. 0.1 (H) 0.00 - 0.04 K/UL    DF AUTOMATED     METABOLIC PANEL, BASIC    Collection Time: 03/22/21  5:28 AM   Result Value Ref Range    Sodium 138 136 - 145 mmol/L    Potassium 4.3 3.5 - 5.1 mmol/L    Chloride 107 97 - 108 mmol/L    CO2 28 21 - 32 mmol/L    Anion gap 3 (L) 5 - 15 mmol/L    Glucose 101 (H) 65 - 100 mg/dL    BUN 16 6 - 20 MG/DL    Creatinine 0.61 0.55 - 1.02 MG/DL    BUN/Creatinine ratio 26 (H) 12 - 20      GFR est AA >60 >60 ml/min/1.73m2    GFR est non-AA >60 >60 ml/min/1.73m2    Calcium 9.1 8.5 - 10.1 MG/DL         ORTHOPEDIC PHYSICAL EXAM:  LLE MSK: Left hip with dressing to the lateral hip. Dressing is C/D/I. Compartments of the left thigh and calf are soft and compressible. Mild TTP of the left lateral hip. 2+ DP pulse. Cap refill is brisk. Sensation to light touch intact to the medial/lateral/posterior lower leg and foot. 5/5 strength with dorsi/plantar flexion. Able to flex and extend toes. No pain on passive flexion of great toe. Negative Hommans sign. ORTHOPEDIC ASSESSMENT:   Moderately displaced intertrochanteric left femur fracture that is now s/p left femur intramedullary nail    ORTHOPEDIC PLAN:  1. Followed by Dr. Edwin Miller  2. Ortho plan: no further ortho surgery  3. Abx: Post op abx completed  4. DVT ppx:  Lovenox injections 40mg sub q once daily until 10 days post-op  5. Pain control: adequate, per admitting team, ice and elevation. 6. Dressing: May leave dressing in place if remains C/D/I. Change prn for saturation with dry sterile dressing. 7. Activity: WBAT LLE   8. Dispo: Anticipate SNF placement (healthcare unit at [facility]). Awaiting PT/OT recs. Continue to follow. Pt can f/u with Dr. Edwin Miller in 1 month.     [place], [address]

## 2021-03-23 LAB
ANION GAP SERPL CALC-SCNC: 6 MMOL/L (ref 5–15)
BASOPHILS # BLD: 0.1 K/UL (ref 0–0.1)
BASOPHILS NFR BLD: 1 % (ref 0–1)
BUN SERPL-MCNC: 17 MG/DL (ref 6–20)
BUN/CREAT SERPL: 31 (ref 12–20)
CALCIUM SERPL-MCNC: 9 MG/DL (ref 8.5–10.1)
CHLORIDE SERPL-SCNC: 103 MMOL/L (ref 97–108)
CO2 SERPL-SCNC: 27 MMOL/L (ref 21–32)
CREAT SERPL-MCNC: 0.55 MG/DL (ref 0.55–1.02)
DIFFERENTIAL METHOD BLD: ABNORMAL
EOSINOPHIL # BLD: 0.3 K/UL (ref 0–0.4)
EOSINOPHIL NFR BLD: 3 % (ref 0–7)
ERYTHROCYTE [DISTWIDTH] IN BLOOD BY AUTOMATED COUNT: 14.6 % (ref 11.5–14.5)
GLUCOSE SERPL-MCNC: 106 MG/DL (ref 65–100)
HCT VFR BLD AUTO: 29.1 % (ref 35–47)
HGB BLD-MCNC: 9.5 G/DL (ref 11.5–16)
IMM GRANULOCYTES # BLD AUTO: 0.1 K/UL (ref 0–0.04)
IMM GRANULOCYTES NFR BLD AUTO: 1 % (ref 0–0.5)
LYMPHOCYTES # BLD: 1.1 K/UL (ref 0.8–3.5)
LYMPHOCYTES NFR BLD: 12 % (ref 12–49)
MCH RBC QN AUTO: 30.4 PG (ref 26–34)
MCHC RBC AUTO-ENTMCNC: 32.6 G/DL (ref 30–36.5)
MCV RBC AUTO: 93 FL (ref 80–99)
MONOCYTES # BLD: 1 K/UL (ref 0–1)
MONOCYTES NFR BLD: 11 % (ref 5–13)
NEUTS SEG # BLD: 6.7 K/UL (ref 1.8–8)
NEUTS SEG NFR BLD: 72 % (ref 32–75)
NRBC # BLD: 0 K/UL (ref 0–0.01)
NRBC BLD-RTO: 0 PER 100 WBC
PLATELET # BLD AUTO: 186 K/UL (ref 150–400)
PMV BLD AUTO: 11.5 FL (ref 8.9–12.9)
POTASSIUM SERPL-SCNC: 4 MMOL/L (ref 3.5–5.1)
RBC # BLD AUTO: 3.13 M/UL (ref 3.8–5.2)
SODIUM SERPL-SCNC: 136 MMOL/L (ref 136–145)
WBC # BLD AUTO: 9.2 K/UL (ref 3.6–11)

## 2021-03-23 PROCEDURE — 97110 THERAPEUTIC EXERCISES: CPT | Performed by: OCCUPATIONAL THERAPIST

## 2021-03-23 PROCEDURE — 65270000029 HC RM PRIVATE

## 2021-03-23 PROCEDURE — 36415 COLL VENOUS BLD VENIPUNCTURE: CPT

## 2021-03-23 PROCEDURE — 74011250637 HC RX REV CODE- 250/637: Performed by: ORTHOPAEDIC SURGERY

## 2021-03-23 PROCEDURE — 97530 THERAPEUTIC ACTIVITIES: CPT

## 2021-03-23 PROCEDURE — 74011000250 HC RX REV CODE- 250: Performed by: ORTHOPAEDIC SURGERY

## 2021-03-23 PROCEDURE — 80048 BASIC METABOLIC PNL TOTAL CA: CPT

## 2021-03-23 PROCEDURE — 74011250636 HC RX REV CODE- 250/636: Performed by: ORTHOPAEDIC SURGERY

## 2021-03-23 PROCEDURE — 85025 COMPLETE CBC W/AUTO DIFF WBC: CPT

## 2021-03-23 PROCEDURE — 97535 SELF CARE MNGMENT TRAINING: CPT | Performed by: OCCUPATIONAL THERAPIST

## 2021-03-23 PROCEDURE — 74011250637 HC RX REV CODE- 250/637: Performed by: FAMILY MEDICINE

## 2021-03-23 RX ADMIN — DORZOLAMIDE HYDROCHLORIDE 1 DROP: 20 SOLUTION/ DROPS OPHTHALMIC at 17:08

## 2021-03-23 RX ADMIN — DORZOLAMIDE HYDROCHLORIDE 1 DROP: 20 SOLUTION/ DROPS OPHTHALMIC at 09:30

## 2021-03-23 RX ADMIN — LATANOPROST 1 DROP: 50 SOLUTION OPHTHALMIC at 22:52

## 2021-03-23 RX ADMIN — Medication 5 ML: at 07:08

## 2021-03-23 RX ADMIN — CARBIDOPA AND LEVODOPA 1 TABLET: 25; 100 TABLET ORAL at 09:27

## 2021-03-23 RX ADMIN — DORZOLAMIDE HYDROCHLORIDE 1 DROP: 20 SOLUTION/ DROPS OPHTHALMIC at 22:52

## 2021-03-23 RX ADMIN — CARBIDOPA AND LEVODOPA 1 TABLET: 25; 100 TABLET ORAL at 17:07

## 2021-03-23 RX ADMIN — ENOXAPARIN SODIUM 40 MG: 40 INJECTION SUBCUTANEOUS at 09:27

## 2021-03-23 RX ADMIN — ACETAMINOPHEN 650 MG: 325 TABLET ORAL at 12:06

## 2021-03-23 RX ADMIN — ACETAMINOPHEN 650 MG: 325 TABLET ORAL at 01:43

## 2021-03-23 RX ADMIN — DOCUSATE SODIUM 50 MG AND SENNOSIDES 8.6 MG 1 TABLET: 8.6; 5 TABLET, FILM COATED ORAL at 17:07

## 2021-03-23 RX ADMIN — TIMOLOL MALEATE 1 DROP: 5 SOLUTION OPHTHALMIC at 17:08

## 2021-03-23 RX ADMIN — DOCUSATE SODIUM 50 MG AND SENNOSIDES 8.6 MG 1 TABLET: 8.6; 5 TABLET, FILM COATED ORAL at 09:27

## 2021-03-23 RX ADMIN — CARBIDOPA AND LEVODOPA 1 TABLET: 25; 100 TABLET ORAL at 22:52

## 2021-03-23 RX ADMIN — Medication 10 ML: at 22:53

## 2021-03-23 RX ADMIN — TIMOLOL MALEATE 1 DROP: 5 SOLUTION OPHTHALMIC at 09:29

## 2021-03-23 RX ADMIN — SERTRALINE 50 MG: 50 TABLET, FILM COATED ORAL at 22:52

## 2021-03-23 RX ADMIN — Medication 1 TABLET: at 09:28

## 2021-03-23 RX ADMIN — Medication 1 TABLET: at 12:06

## 2021-03-23 RX ADMIN — Medication 1 TABLET: at 17:07

## 2021-03-23 RX ADMIN — ACETAMINOPHEN 650 MG: 325 TABLET ORAL at 17:07

## 2021-03-23 RX ADMIN — POLYETHYLENE GLYCOL 3350 17 G: 17 POWDER, FOR SOLUTION ORAL at 09:27

## 2021-03-23 NOTE — PROGRESS NOTES
Problem: Self Care Deficits Care Plan (Adult)  Goal: *Acute Goals and Plan of Care (Insert Text)  Description:   FUNCTIONAL STATUS PRIOR TO ADMISSION: Patient was modified independent using a rollator for functional mobility, lives at [address], and per patient report, was was modified independent for basic and instrumental ADLs. HOME SUPPORT: Admitted from [facility], reports she was living in [address] [place] [address] but not confirmed d/t confusion    Occupational Therapy Goals  Initiated 3/20/2021  1. Patient will perform standing grooming x2 minutes with minimal assistance within 7 day(s). 2.  Patient will perform bathing with minimal assistance & AE PRN within 7 day(s). 3.  Patient will perform lower body dressing with minimal assistance & AE PRN within 7 day(s). 4.  Patient will perform toilet transfers with minimal assistance within 7 day(s). 5.  Patient will perform all aspects of toileting with minimal assistance within 7 day(s). 6.  Patient will participate in upper extremity therapeutic exercise/activities with supervision/set-up for 5 minutes within 7 day(s). 7.  Patient will utilize energy conservation techniques during functional activities with verbal and visual cues within 7 day(s). Outcome: Progressing Towards Goal slowly    OCCUPATIONAL THERAPY TREATMENT  Patient: Carmie Nissen (77 y.o. female)  Date: 3/23/2021  Diagnosis: Hip fracture ([facility] [address] [S72.009A] <principal problem not specified>  Procedure(s) (LRB):  LEFT FEMUR INSERTION INTRA MEDULLARY NAIL  ESSENTIAL (Left) 4 Days Post-Op  Precautions: WBAT  Chart, occupational therapy assessment, plan of care, and goals were reviewed. ASSESSMENT  Patient continues with skilled OT services and is slowly progressing towards goals. Patient unable to stand from chair with max assist of one however limited by confusion and minimal effort to perform due to pain with movement.  Will benefit from continued UE strengthening to increase ability to push to stand     Current Level of Function Impacting Discharge (ADLs): total assist toileting, assist of 2 for transfers    Other factors to consider for discharge:          PLAN :  Patient continues to benefit from skilled intervention to address the above impairments. Continue treatment per established plan of care. to address goals. Recommend with staff: assist of 2 for transfers    Recommend next OT session: UE strengthening, static standing with bilateral UE support    Recommendation for discharge: (in order for the patient to meet his/her long term goals)  Therapy up to 5 days/week in SNF setting    This discharge recommendation:  Has not yet been discussed the attending provider and/or case management    IF patient discharges home will need the following DME:        SUBJECTIVE:   Patient stated I'm just going to sit.     OBJECTIVE DATA SUMMARY:   Cognitive/Behavioral Status:  Neurologic State: Alert;Confused  Orientation Level: Oriented to person;Disoriented to place; Disoriented to time;Disoriented to situation  Cognition: Decreased attention/concentration;Decreased command following  Perception: Appears intact  Perseveration: Perseverates during conversation       Functional Mobility and Transfers for ADLs:  Bed Mobility:  Supine to Sit: Moderate assistance; Additional time;Assist x1  Sit to Supine: (remained OOB in chair)    Transfers:  Sit to Stand: Total assistance; Other (comment)(unable to facilitate stand with total assist of 1)     Bed to Chair: Minimum assistance; Moderate assistance;Assist x2    Balance:  Sitting: Impaired  Sitting - Static: Fair (occasional)  Sitting - Dynamic: Poor (constant support)  Standing: Impaired; With support  Standing - Static: Constant support  Standing - Dynamic : Constant support;Poor    ADL Intervention:     Educated on need to increase activity and benefit of standing    Instructed on positioning in chair, techniques to scoot to edge of chair leaning posteriorly or lateral weight shift, instructed on use of UE's, minimal effort provided and total assist to scoot to edge of chair          Toileting  Toileting Assistance: Total assistance(dependent)         Therapeutic Exercises:   Instructed on chair push up using right LE and UE's on armrests of chair, mod assist to position hands on armrests and max encouragement to push to squat/try and get air under sacrum. Noted increased work of UE's however unable to clear sacrum from chair, completed x's 5 reps. Instructed to perform elbow extension with therapist providing resistance at patient wrist to facilitate tricep strengthening, required max multi-modal cues to perform, completed 1 set 10 reps bilaterally    Pain:  Left hip, ice in place on arrival and replaced after     Activity Tolerance:   Poor    After treatment patient left in no apparent distress:   Sitting in chair and Call bell within reach, chair alarm on    COMMUNICATION/COLLABORATION:   The patients plan of care was discussed with: Physical therapist and Registered nurse.      Torrey Teague OTR/L  Time Calculation: 23 mins

## 2021-03-23 NOTE — PROGRESS NOTES
EMERALD: Plan for discharge to [facility] healthcare unit when medically stable. Insurance Lorna Michael was started yesterday by the facility. This facility does not require covid testing. BLS transport at discharge.     Chart reviewed. CM called Dion Schaeffer at [facility] [phone] and left message to follow-up on status of auth. CM updated patient and daughter Jerome Mckinley #[phone] at bedside.      Ca Reyes, BSW/CRM

## 2021-03-23 NOTE — PROGRESS NOTES
Problem: Mobility Impaired (Adult and Pediatric)  Goal: *Acute Goals and Plan of Care (Insert Text)  Description: FUNCTIONAL STATUS PRIOR TO ADMISSION: Patient was modified independent using a rollator for functional mobility. HOME SUPPORT PRIOR TO ADMISSION: The patient lived in assisted living at [facility]. Physical Therapy Goals  Initiated 3/20/2021  1. Patient will move from supine to sit and sit to supine  in bed with supervision/set-up within 7 day(s). 2.  Patient will transfer from bed to chair and chair to bed with minimal assistance/contact guard assist using the least restrictive device within 7 day(s). 3.  Patient will perform sit to stand with minimal assistance/contact guard assist within 7 day(s). 4.  Patient will ambulate with minimal assistance/contact guard assist for 50 feet with the least restrictive device within 7 day(s). Outcome: Progressing Towards Goal   PHYSICAL THERAPY TREATMENT  Patient: Jorge Ashton (34 y.o. female)  Date: 3/23/2021  Diagnosis: Hip fracture ([facility] [address] [S72.009A] <principal problem not specified>  Procedure(s) (LRB):  LEFT FEMUR INSERTION INTRA MEDULLARY NAIL  ESSENTIAL (Left) 4 Days Post-Op  Precautions: WBAT  Chart, physical therapy assessment, plan of care and goals were reviewed. ASSESSMENT  Patient continues with skilled PT services and is minimally progressing towards goals. Continues to present w/ pleasant confusion. She was able to perform functional transfers at bedside w/ assist x2 overall. She came to sit EOB ,w/ HOB elevated, w/ Mod Ax1 and additional time, needing assist at her trunk and w/ her BLE's. She c/o pain w/all mobility and tends to state \" I can't do it\", but completes w/ encouragement and support. She felt like she had to void and was assisted to [facility] w/ Mod Ax2 and RW support. Required both verbal and tactile cues for step sequence and for weight shifting when attempting to step or slide Left foot.  Completed stand pivot transfer to [facility] w/additional time. No success w/ voiding and pt stood again w/ Mod Ax1 to RW, BSC removed from behind pt and pt took very small,shuffled steps backwards w/ moderate cuing (verbal/tactile). Brief put in place prior to pt sitting. Pt remained in chair for lunch w/ chair alarm in place/activated. RN aware. Current Level of Function Impacting Discharge (mobility/balance): Mod Ax1-2 overall for functional transfers. Other factors to consider for discharge: mobility below baseline. Pt Mod I w/ Rollator PTA. PLAN :  Patient continues to benefit from skilled intervention to address the above impairments. Continue treatment per established plan of care. to address goals. Recommendation for discharge: (in order for the patient to meet his/her long term goals)  Therapy up to 5 days/week in SNF setting    This discharge recommendation:  Has been made in collaboration with the attending provider and/or case management    IF patient discharges home will need the following DME: pt owns Rollator. Would benefit from RW if she was to go home; potentially  transport w/c vs TBD. SUBJECTIVE:   Patient stated You lost so much weight! Joni Phi    OBJECTIVE DATA SUMMARY:   Critical Behavior:  Neurologic State: Alert, Confused  Orientation Level: Oriented to person, Oriented to time, Disoriented to place, Disoriented to situation  Cognition: Follows commands, Memory loss, Impaired decision making  Safety/Judgement: Awareness of environment  Functional Mobility Training:  Bed Mobility:     Supine to Sit: Moderate assistance; Additional time;Assist x1  Sit to Supine: (remained OOB in chair)           Transfers:  Sit to Stand: Moderate assistance;Assist x2  Stand to Sit: Moderate assistance;Assist x2        Bed to Chair: Minimum assistance; Moderate assistance;Assist x2                    Balance:  Sitting: Impaired  Sitting - Static: Fair (occasional)  Sitting - Dynamic: Poor (constant support)  Standing: Impaired; With support  Standing - Static: Constant support  Standing - Dynamic : Constant support;Poor  Ambulation/Gait Training:  Distance (ft): 1 Feet (ft)  Assistive Device: Gait belt;Walker, rolling  Ambulation - Level of Assistance: Moderate assistance;Assist x2(bed>chair)        Gait Abnormalities: Antalgic;Decreased step clearance; Step to gait  Right Side Weight Bearing: Full  Left Side Weight Bearing: As tolerated  Base of Support: Narrowed; Shift to right  Stance: Left decreased  Speed/Cassie: Shuffled; Slow  Step Length: Left shortened;Right shortened  Swing Pattern: Left asymmetrical;Right asymmetrical           Pain Rating: Moderate w/ activity    Activity Tolerance:   Fair    After treatment patient left in no apparent distress:   Sitting in chair, Call bell within reach, Bed / chair alarm activated, and Caregiver / family present    COMMUNICATION/COLLABORATION:   The patients plan of care was discussed with: Registered nurse.      Ana Sargent PTA   Time Calculation: 29 mins

## 2021-03-23 NOTE — PROGRESS NOTES
Ortho Daily Progress Note    3/23/2021    POD:  4Days Post-Op  S/P:  Procedure(s):  Left femur intramedullary nail      HPI: 88 yo F that is POD4, s/p  left femur intramedullary nail with Dr. Mckay on 3/19/2021. She reports improvement of pain control today. Her daughter is at bedside and concerned about her mother's progress. Had a lengthy conversation with her daughter in regards to her mother's progression and benefits of PT when she continues following discharge which seemed to reassure her. She has no other orthopedic complaints. He denies paresthesias, SOB, fever, chills.    LABS:  Lab Results   Component Value Date/Time    HGB 9.5 (L) 03/23/2021 01:48 AM    INR 1.1 03/18/2021 06:01 PM     No results found for this or any previous visit (from the past 12 hour(s)).      ORTHOPEDIC PHYSICAL EXAM:  LLE MSK: Left hip with dressing to the lateral hip. Dressing is C/D/I. Compartments of the left thigh and calf are soft and compressible. Non-TTP of the left lateral hip. 2+ DP pulse. Cap refill is brisk. Sensation to light touch intact to the medial/lateral/posterior lower leg and foot.  5/5 strength with dorsi/plantar flexion. Able to flex and extend toes. No pain on passive flexion of great toe. Negative Hommans sign.     ORTHOPEDIC ASSESSMENT:   Moderately displaced intertrochanteric left femur fracture that is now s/p left femur intramedullary nail    ORTHOPEDIC PLAN:  1. Followed by Dr. Mckay  2. Ortho plan: no further ortho surgery  3. Abx: Post op abx completed  4. DVT ppx:  Lovenox injections 40mg sub q once daily until 10 days post-op  5. Pain control: adequate, per admitting team, ice and elevation.    6. Dressing: May leave dressing in place if remains C/D/I. Change prn for saturation with dry sterile dressing.   7. Activity: WBAT LLE   8.   Dispo: Anticipate SNF placement (healthcare unit at [facility]). Pt can f/u with Dr. Mckay in 1 month.    JUAN Syed   Orthopedic Trauma Service  [place]  [address] [facility]

## 2021-03-23 NOTE — ROUTINE PROCESS
Bedside shift change report given to Susanne Amaral RN (oncoming nurse) by Noy Blank RN (offgoing nurse).  Report included the following information SBAR

## 2021-03-23 NOTE — PROGRESS NOTES
Hospitalist Progress Note          Oneil Hernandez NP  Please call  and page for questions. Call physician on-call through the  7pm-7am    Daily Progress Note: 3/22/2021    Primary care provider:Franchesca Herrmann MD    Date of admission: 3/18/2021 12:32 PM    Admission Summary:     From H&P 3/18/2021 12:32 PM:  Raghu Cadena is a 80 y.o. female who presents with after a fall  History is primary obtained from the patient and her daughter was present at the bedside  Patient reports that she fell this morning. Patient reports that her Rollator fell and she went down with her. Patient reports she did not hit her head. Patient reports that immediately she started having pain in her left hip. Patient got concerned and decided to come to the hospital.  Patient is found to have a left hip fracture and was requested to be admitted to the hospital service. Patient denies any other complaints or problems. The patient denies any Headache, blurry vision, sore throat, trouble swallowing, trouble with speech, chest pain, SOB, cough, fever, chills, N/V/D, abd pain, urinary symptoms, constipation, recent travels, sick contacts, focal or generalized neurological symptoms,, injuries, rashes, contact with COVID-19 diagnosed patients, hematemesis, melena, hemoptysis, hematuria, rashes, denies starting any new medications and denies any other concerns or problems besides as mentioned above. \"    Hospital Course:     03/19: Left IM hip nailing    03/21: Hgb 7.4 w/ dizziness, nausea, fatigue, low/normal BP (hgb 12 POA) Rec'd 1u PRBC    Subjective:   F/U on L hip fracture    Pt seen today in bed in [facility]. She c/o chronic neck pain and some dizziness which her daughter reported was also chronic. Doing better today after 1u blood yesterday. Auth pending for d/c back to [facility].     Assessment/Plan:       Left femoral fracture: s/p IM nailing (02/19)  -Resolved  -Pain control  -PT/OT per ortho  -Appreciate ortho input    Abnormal UA: UTI ruled out  -Urine culture w/ no growth    Dehydration  -Resolved w/ IVF  -Encourage PO intake    ABL Anemia         -Resolving           (3/19)   (3/20)   (3/21)                (3/21)    (3/22)  -Hgb POA 12.1-->10.1---->8.6---->7.4--1u prbc-->10.4---->9.8  -Monitor HH and transfuse less than 7    Parkinsons: w/ intermittent confusion  -Stable, per daughter confusion started w/ COVID isolation and has increased some in last few months  -cont sinimet  -supportive care  -fall precuations  -judicial use of opiates    DVTppx: SCDs, lovenox  Gippx: NA  Code Status: Full code  Diet: Regular  Activity: OOB to chair TID and PRN  Discharge: Plan to discharge back to [facility] for SNF services when auth is received; next 1-2 days          Review of Systems:     Full ROS complete with pertinent positives and negatives as per HPI, otherwise negative    Objective:   Physical Exam:     Visit Vitals  /79   Pulse 83   Temp 98.2 °F (36.8 °C)   Resp 18   Ht 5' 5\" (1.651 m)   Wt 54.9 kg (121 lb)   SpO2 91%   BMI 20.14 kg/m²    O2 Flow Rate (L/min): 2 l/min O2 Device: Room air    Temp (24hrs), Av.1 °F (36.7 °C), Min:97.5 °F (36.4 °C), Max:98.4 °F (36.9 °C)    No intake/output data recorded.  07 -  1900  In: 348. 3   Out: 1000 [Urine:1000]      General:  Alert, cooperative, no distress, appears stated age. Lungs:   Clear to auscultation bilaterally. Heart:  Regular rate and rhythm, S1, S2 normal, no murmur, click, rub or gallop. Abdomen:   Soft, non-tender, non-distended. Bowel sounds normal.    Extremities: Extremities normal, atraumatic, no cyanosis or edema. Left hip dressing c/d/i   Skin: Skin color, texture, turgor normal. No rashes or lesions   Neurologic: CNII-XII intact. Equal Strength. ANGULO.  A&Ox2-3 w/ intermittent confusion     Data Review:       Recent Days:  Recent Labs     21  0528 21  1923 21  0645 21  0707   WBC 9.0  -- 8. 9 12.1*   HGB 9.8* 10.4* 7.4* 8.6*   HCT 30.8* 32.3* 23.4* 28.1*     --  152 174     Recent Labs     03/22/21  0528 03/21/21  0645 03/20/21  0707    140 143   K 4.3 3.8 3.7    109* 114*   CO2 28 26 24   * 104* 106*   BUN 16 17 14   CREA 0.61 0.55 0.60   CA 9.1 8.6 8.5     No results for input(s): PH, PCO2, PO2, HCO3, FIO2 in the last 72 hours. 24 Hour Results:  Recent Results (from the past 24 hour(s))   CBC WITH AUTOMATED DIFF    Collection Time: 03/22/21  5:28 AM   Result Value Ref Range    WBC 9.0 3.6 - 11.0 K/uL    RBC 3.22 (L) 3.80 - 5.20 M/uL    HGB 9.8 (L) 11.5 - 16.0 g/dL    HCT 30.8 (L) 35.0 - 47.0 %    MCV 95.7 80.0 - 99.0 FL    MCH 30.4 26.0 - 34.0 PG    MCHC 31.8 30.0 - 36.5 g/dL    RDW 14.9 (H) 11.5 - 14.5 %    PLATELET 985 856 - 488 K/uL    MPV 11.2 8.9 - 12.9 FL    NRBC 0.0 0  WBC    ABSOLUTE NRBC 0.00 0.00 - 0.01 K/uL    NEUTROPHILS 68 32 - 75 %    LYMPHOCYTES 13 12 - 49 %    MONOCYTES 13 5 - 13 %    EOSINOPHILS 3 0 - 7 %    BASOPHILS 1 0 - 1 %    IMMATURE GRANULOCYTES 2 (H) 0.0 - 0.5 %    ABS. NEUTROPHILS 6.2 1.8 - 8.0 K/UL    ABS. LYMPHOCYTES 1.2 0.8 - 3.5 K/UL    ABS. MONOCYTES 1.2 (H) 0.0 - 1.0 K/UL    ABS. EOSINOPHILS 0.3 0.0 - 0.4 K/UL    ABS. BASOPHILS 0.1 0.0 - 0.1 K/UL    ABS. IMM.  GRANS. 0.1 (H) 0.00 - 0.04 K/UL    DF AUTOMATED     METABOLIC PANEL, BASIC    Collection Time: 03/22/21  5:28 AM   Result Value Ref Range    Sodium 138 136 - 145 mmol/L    Potassium 4.3 3.5 - 5.1 mmol/L    Chloride 107 97 - 108 mmol/L    CO2 28 21 - 32 mmol/L    Anion gap 3 (L) 5 - 15 mmol/L    Glucose 101 (H) 65 - 100 mg/dL    BUN 16 6 - 20 MG/DL    Creatinine 0.61 0.55 - 1.02 MG/DL    BUN/Creatinine ratio 26 (H) 12 - 20      GFR est AA >60 >60 ml/min/1.73m2    GFR est non-AA >60 >60 ml/min/1.73m2    Calcium 9.1 8.5 - 10.1 MG/DL       Problem List:  Problem List as of 3/22/2021 Never Reviewed          Codes Class Noted - Resolved    [facility] ICD-10-CM: S72.009A  ICD-9-CM: 820.8  3/18/2021 - Present              Medications reviewed  Current Facility-Administered Medications   Medication Dose Route Frequency    0.9% sodium chloride infusion 250 mL  250 mL IntraVENous PRN    traMADoL (ULTRAM) tablet 50 mg  50 mg Oral Q4H PRN    HYDROcodone-acetaminophen (NORCO) 5-325 mg per tablet 1 Tab  1 Tab Oral Q4H PRN    dorzolamide (TRUSOPT) 2 % ophthalmic solution 1 Drop  1 Drop Both Eyes TID    And    timolol (TIMOPTIC) 0.5 % ophthalmic solution 1 Drop  1 Drop Both Eyes BID    sodium chloride (NS) flush 5-40 mL  5-40 mL IntraVENous Q8H    sodium chloride (NS) flush 5-40 mL  5-40 mL IntraVENous PRN    naloxone (NARCAN) injection 0.4 mg  0.4 mg IntraVENous PRN    calcium-vitamin D (OS-KOSTAS +D3) 500 mg-200 unit per tablet 1 Tab  1 Tab Oral TID WITH MEALS    senna-docusate (PERICOLACE) 8.6-50 mg per tablet 1 Tab  1 Tab Oral BID    polyethylene glycol (MIRALAX) packet 17 g  17 g Oral DAILY    bisacodyL (DULCOLAX) suppository 10 mg  10 mg Rectal DAILY PRN    acetaminophen (TYLENOL) tablet 650 mg  650 mg Oral Q6H    enoxaparin (LOVENOX) injection 40 mg  40 mg SubCUTAneous DAILY    carbidopa-levodopa (SINEMET)  mg per tablet 1 Tab  1 Tab Oral TID    sertraline (ZOLOFT) tablet 50 mg  50 mg Oral QHS    latanoprost (XALATAN) 0.005 % ophthalmic solution 1 Drop  1 Drop Both Eyes QHS    ondansetron (ZOFRAN) injection 4 mg  4 mg IntraVENous Q4H PRN    lidocaine 4 % patch 1 Patch  1 Patch TransDERmal Q24H       Care Plan discussed with: Patient/family, nurse      Raphael Billings NP  Hospitalist  Providers can reach me on Perfect[employer]

## 2021-03-24 VITALS
DIASTOLIC BLOOD PRESSURE: 64 MMHG | HEIGHT: 65 IN | WEIGHT: 123 LBS | HEART RATE: 68 BPM | BODY MASS INDEX: 20.49 KG/M2 | RESPIRATION RATE: 14 BRPM | SYSTOLIC BLOOD PRESSURE: 109 MMHG | OXYGEN SATURATION: 90 % | TEMPERATURE: 98.7 F

## 2021-03-24 PROBLEM — H26.491 POSTERIOR CAPSULAR OPACIFICATION VISUALLY SIGNIFICANT OF RIGHT EYE: Status: ACTIVE | Noted: 2018-02-19

## 2021-03-24 PROBLEM — M54.50 LOW BACK PAIN: Status: ACTIVE | Noted: 2019-01-14

## 2021-03-24 PROBLEM — H40.1432: Status: ACTIVE | Noted: 2018-02-19

## 2021-03-24 PROBLEM — M51.36 DDD (DEGENERATIVE DISC DISEASE), LUMBAR: Status: ACTIVE | Noted: 2019-01-14

## 2021-03-24 PROBLEM — H04.123 BILATERAL DRY EYES: Status: ACTIVE | Noted: 2018-02-19

## 2021-03-24 PROBLEM — M41.25 OTHER IDIOPATHIC SCOLIOSIS, THORACOLUMBAR REGION: Status: ACTIVE | Noted: 2019-01-14

## 2021-03-24 LAB
ANION GAP SERPL CALC-SCNC: 6 MMOL/L (ref 5–15)
BASOPHILS # BLD: 0.1 K/UL (ref 0–0.1)
BASOPHILS NFR BLD: 1 % (ref 0–1)
BUN SERPL-MCNC: 18 MG/DL (ref 6–20)
BUN/CREAT SERPL: 27 (ref 12–20)
CALCIUM SERPL-MCNC: 9.1 MG/DL (ref 8.5–10.1)
CHLORIDE SERPL-SCNC: 103 MMOL/L (ref 97–108)
CO2 SERPL-SCNC: 26 MMOL/L (ref 21–32)
CREAT SERPL-MCNC: 0.66 MG/DL (ref 0.55–1.02)
DIFFERENTIAL METHOD BLD: ABNORMAL
EOSINOPHIL # BLD: 0.2 K/UL (ref 0–0.4)
EOSINOPHIL NFR BLD: 3 % (ref 0–7)
ERYTHROCYTE [DISTWIDTH] IN BLOOD BY AUTOMATED COUNT: 14.1 % (ref 11.5–14.5)
GLUCOSE SERPL-MCNC: 98 MG/DL (ref 65–100)
HCT VFR BLD AUTO: 27.8 % (ref 35–47)
HGB BLD-MCNC: 9 G/DL (ref 11.5–16)
IMM GRANULOCYTES # BLD AUTO: 0.2 K/UL (ref 0–0.04)
IMM GRANULOCYTES NFR BLD AUTO: 2 % (ref 0–0.5)
LYMPHOCYTES # BLD: 1.3 K/UL (ref 0.8–3.5)
LYMPHOCYTES NFR BLD: 14 % (ref 12–49)
MCH RBC QN AUTO: 30.2 PG (ref 26–34)
MCHC RBC AUTO-ENTMCNC: 32.4 G/DL (ref 30–36.5)
MCV RBC AUTO: 93.3 FL (ref 80–99)
MONOCYTES # BLD: 1.1 K/UL (ref 0–1)
MONOCYTES NFR BLD: 12 % (ref 5–13)
NEUTS SEG # BLD: 6.3 K/UL (ref 1.8–8)
NEUTS SEG NFR BLD: 68 % (ref 32–75)
NRBC # BLD: 0 K/UL (ref 0–0.01)
NRBC BLD-RTO: 0 PER 100 WBC
PLATELET # BLD AUTO: 188 K/UL (ref 150–400)
PMV BLD AUTO: 10.8 FL (ref 8.9–12.9)
POTASSIUM SERPL-SCNC: 4 MMOL/L (ref 3.5–5.1)
RBC # BLD AUTO: 2.98 M/UL (ref 3.8–5.2)
SODIUM SERPL-SCNC: 135 MMOL/L (ref 136–145)
WBC # BLD AUTO: 9.3 K/UL (ref 3.6–11)

## 2021-03-24 PROCEDURE — 36415 COLL VENOUS BLD VENIPUNCTURE: CPT

## 2021-03-24 PROCEDURE — 74011250637 HC RX REV CODE- 250/637: Performed by: FAMILY MEDICINE

## 2021-03-24 PROCEDURE — 80048 BASIC METABOLIC PNL TOTAL CA: CPT

## 2021-03-24 PROCEDURE — 74011250636 HC RX REV CODE- 250/636: Performed by: ORTHOPAEDIC SURGERY

## 2021-03-24 PROCEDURE — 97535 SELF CARE MNGMENT TRAINING: CPT

## 2021-03-24 PROCEDURE — 85025 COMPLETE CBC W/AUTO DIFF WBC: CPT

## 2021-03-24 PROCEDURE — 77030038269 HC DRN EXT URIN PURWCK BARD -A

## 2021-03-24 PROCEDURE — 74011250637 HC RX REV CODE- 250/637: Performed by: ORTHOPAEDIC SURGERY

## 2021-03-24 RX ORDER — LIDOCAINE 4 G/100G
PATCH TOPICAL
Qty: 6 PATCH | Refills: 0 | Status: SHIPPED | OUTPATIENT
Start: 2021-03-25

## 2021-03-24 RX ORDER — TRAMADOL HYDROCHLORIDE 50 MG/1
50 TABLET ORAL
Qty: 21 TAB | Refills: 0 | Status: SHIPPED | OUTPATIENT
Start: 2021-03-24 | End: 2021-03-31

## 2021-03-24 RX ORDER — ACETAMINOPHEN 325 MG/1
650 TABLET ORAL EVERY 6 HOURS
Status: SHIPPED | COMMUNITY
Start: 2021-03-24

## 2021-03-24 RX ORDER — AMOXICILLIN 250 MG
1 CAPSULE ORAL 2 TIMES DAILY
Status: SHIPPED | COMMUNITY
Start: 2021-03-24

## 2021-03-24 RX ORDER — TRAMADOL HYDROCHLORIDE 50 MG/1
50 TABLET ORAL
Qty: 42 TAB | Refills: 0 | Status: SHIPPED | OUTPATIENT
Start: 2021-03-24 | End: 2021-03-24

## 2021-03-24 RX ORDER — ENOXAPARIN SODIUM 100 MG/ML
40 INJECTION SUBCUTANEOUS DAILY
Qty: 2 ML | Refills: 0 | Status: SHIPPED | OUTPATIENT
Start: 2021-03-25 | End: 2021-03-30

## 2021-03-24 RX ADMIN — ENOXAPARIN SODIUM 40 MG: 40 INJECTION SUBCUTANEOUS at 09:27

## 2021-03-24 RX ADMIN — DOCUSATE SODIUM 50 MG AND SENNOSIDES 8.6 MG 1 TABLET: 8.6; 5 TABLET, FILM COATED ORAL at 09:27

## 2021-03-24 RX ADMIN — Medication 1 TABLET: at 08:41

## 2021-03-24 RX ADMIN — ACETAMINOPHEN 650 MG: 325 TABLET ORAL at 10:48

## 2021-03-24 RX ADMIN — CARBIDOPA AND LEVODOPA 1 TABLET: 25; 100 TABLET ORAL at 09:27

## 2021-03-24 RX ADMIN — Medication 10 ML: at 06:00

## 2021-03-24 RX ADMIN — TIMOLOL MALEATE 1 DROP: 5 SOLUTION OPHTHALMIC at 09:34

## 2021-03-24 RX ADMIN — POLYETHYLENE GLYCOL 3350 17 G: 17 POWDER, FOR SOLUTION ORAL at 09:27

## 2021-03-24 RX ADMIN — DORZOLAMIDE HYDROCHLORIDE 1 DROP: 20 SOLUTION/ DROPS OPHTHALMIC at 09:34

## 2021-03-24 RX ADMIN — ACETAMINOPHEN 650 MG: 325 TABLET ORAL at 02:23

## 2021-03-24 NOTE — PROGRESS NOTES
Problem: Self Care Deficits Care Plan (Adult)  Goal: *Acute Goals and Plan of Care (Insert Text)  Description:   FUNCTIONAL STATUS PRIOR TO ADMISSION: Patient was modified independent using a rollator for functional mobility, lives at [address], and per patient report, was was modified independent for basic and instrumental ADLs. HOME SUPPORT: Admitted from [facility], reports she was living in [address] [place] [address] but not confirmed d/t confusion    Occupational Therapy Goals  Initiated 3/20/2021  1. Patient will perform standing grooming x2 minutes with minimal assistance within 7 day(s). 2.  Patient will perform bathing with minimal assistance & AE PRN within 7 day(s). 3.  Patient will perform lower body dressing with minimal assistance & AE PRN within 7 day(s). 4.  Patient will perform toilet transfers with minimal assistance within 7 day(s). 5.  Patient will perform all aspects of toileting with minimal assistance within 7 day(s). 6.  Patient will participate in upper extremity therapeutic exercise/activities with supervision/set-up for 5 minutes within 7 day(s). 7.  Patient will utilize energy conservation techniques during functional activities with verbal and visual cues within 7 day(s). Outcome: Progressing Towards Goal    OCCUPATIONAL THERAPY TREATMENT  Patient: Caty Abdul (20 y.o. female)  Date: 3/24/2021  Diagnosis: Hip fracture ([facility] [address] [S72.009A] <principal problem not specified>  Procedure(s) (LRB):  LEFT FEMUR INSERTION INTRA MEDULLARY NAIL  ESSENTIAL (Left) 5 Days Post-Op  Precautions: WBAT  Chart, occupational therapy assessment, plan of care, and goals were reviewed. ASSESSMENT  Patient continues with skilled OT services and is slowly progressing towards goals. Pt with increased AxO this date as she was able to verbalize being at [facility]; however, she continues to present with confusion.  Pt benefited from Max A for rolling, with high c/o L hip pain (did not quantify) and total A for bed level toileting. Pt continues to benefit from skilled OT to address functional deficits during acute hospitalization, with reporting therapist believing pt would benefit from discharge to [facility] center at [facility] DAREK HUBER. Current Level of Function Impacting Discharge (ADLs): Max A-Total A    Other factors to consider for discharge: Max A-Total A         PLAN :  Patient continues to benefit from skilled intervention to address the above impairments. Continue treatment per established plan of care. to address goals. Recommend with staff: Frequent positional changes    Recommend next OT session: POC progression    Recommendation for discharge: (in order for the patient to meet his/her long term goals)  Therapy up to 5 days/week in SNF setting    This discharge recommendation:  Has been made in collaboration with the attending provider and/or case management    IF patient discharges home will need the following DME: patient owns DME required for discharge       SUBJECTIVE:   Patient stated I need to use the restroom.     OBJECTIVE DATA SUMMARY:   Cognitive/Behavioral Status:  Neurologic State: Alert;Confused  Orientation Level: Oriented to person  Cognition: Decreased attention/concentration;Decreased command following; Impaired decision making     Perseveration: No perseveration noted  Safety/Judgement: Decreased awareness of need for safety    Functional Mobility and Transfers for ADLs:  Bed Mobility:  Max A for rolling side to side    ADL Intervention:  Lower Body Bathing  Bathing Assistance:  Maximum assistance  Position Performed: (inclined/rolling side to side)  Lower Body : Maximum assistance  Position Performed: Long sitting on bed  Cues: Verbal cues provided(to engage)    Lower Body Dressing Assistance  Socks: Total assistance (dependent)    Toileting  Toileting Assistance: Total assistance(dependent)  Bladder Hygiene: Total assistance (dependent)  Bowel Hygiene:  Total assistance (dependent)  Clothing Management: Total assistance (dependent)  Cues: Physical assistance for pants down;Physical assistance for pants up; Tactile cues provided;Verbal cues provided  Adaptive Equipment: (bedrails)    Cognitive Retraining  Safety/Judgement: Decreased awareness of need for safety    Pain:  High c/o L hip pain, did not quantify; nursing aware and following    Activity Tolerance:   Fair, Poor, and requires frequent rest breaks    After treatment patient left in no apparent distress:   Supine in bed, Call bell within reach, Bed / chair alarm activated, and Side rails x 3    COMMUNICATION/COLLABORATION:   The patients plan of care was discussed with: Registered nurse, Case management, and PA .      Roc Parra OT  Time Calculation: 23 mins

## 2021-03-24 NOTE — OP NOTES
Open Reduction Internal Fixation Intertrochanteric Hip Fracture with IM Nail  Operative Report       Patient: Fede Mendez MRN: [id number]  SSN: [id number]    YOB: 1931  Age: 80 y.o. Sex: female      DATE OF SURGERY:  3/19/2021    Indications: Fede Mendez was admitted to the hospital with a brief history of left intertrochanteric hip fracture. Preoperative diagnosis:  LEFT HIP INTERTROCHANTERIC FRACTURE    Postoperative diagnosis: LEFT HIP INTERTROCHANTERIC  FRACTURE    Surgeon(s) and Role:     Carolina Maxwell MD (Jody) - Primary     First Assistant: Arik Rojas MD - Fellow     Anesthesia: Spinal    Procedures: LEFT FEMUR INSERTION INTRA MEDULLARY NAIL        Procedure Details:   Open reduction, internal fixation left intertrochanteric hip fracture with IM nail. Findings:. After the procedure had been explained to the patient including the risks, benefits and possible complications, Fede Mendez signed the informed consent. The patient was then brought to the operative suite and placed in supine position. After adequate anesthesia was achieved, the patient was placed upon the fracture table. Peroneal post and foot holders were well padded. The patient underwent a closed reduction of the left intertrochanteric hip fracture. This was achieved by longitudinal traction, internal rotation, and adduction. AP and lateral fluoroscopic images confirmed the fracture was now reduced anatomically. Left hip was then prepped and draped in the usual sterile fashion. A 3 cm incision was made over the tip of the greater trochanter. Hemostasis was achieved with Bovie cautery. Starter awl for a Biomet/Guevara trochanteric fixation nail was inserted through the tip of the trochanter, and into the canal of the femur. Its position was confirmed by fluoroscopy. A guide wire was then passed through this awl and across the fracture site. Its position was confirmed by fluoroscopy. The proximal reamer was then passed by hand over this guide pin in order to open up a proximal portal.  Guide pin and reamer were removed a ball-tip guide wire was inserted through this portal, across the fracture site, and down to the epiphyseal scar of the knee. The position of the guide wire was confirmed by AP and lateral fluoroscopic images. The appropriate reamer was passed as a sound, and reaming was only performed by power if necessary. The trochanteric fixation nail was then passed over the guide wire without difficulty. The guide wire was removed and the targeting guide was inserted through a stab wound in the lateral aspect of the proximal femur. Guide pin was then inserted through the lateral cortex into the neck and head. It stopped just short of the subchondral bone. AP and lateral fluoroscopic images confirmed that the position of the guide pin was centered in the head. Depth gauge was used to determine the appropriate length compression screw. The reamers were passed over the guide pin in order to open up the lateral cortex neck and head. The appropriate length compression screw was then passed over the guide wire without difficulty. The set screw was passed from above with a spring wrench. Traction was removed. The position of the helical blade was confirmed by fluoroscopy. The fracture was then compressed to a stable position, using the proximal targeting guide. At this point, the targeting guide was used to place the distal screw. A small stab incision was made to place it. Its position was confirmed by fluoroscopy. AP and lateral fluoroscopic images confirmed the fracture was reduced anatomically. Wounds were then irrigated with normal saline and closed in layers. Sterile, compressive dressings were applied. The patient was then removed from the fracture table and transferred to the recovery room, alert and oriented, under the care of anesthesia.       Estimated Blood Loss: 100 cc    Fluids:  See anesthesia record    Implants:   Implant Name Type Inv. Item Serial No.  Lot No. LRB No. Used Action   SCREW BONE L90MM LAG SLD SLDE - SNA  SCREW BONE L90MM LAG SLD SLDE NA [employer]WD [id number] Left 1 Implanted   SCREW BNE L30MM DIA5MM TIB TI DBL LD FOR [place] NAIL SYS - SNA  SCREW BNE L30MM DIA5MM TIB TI DBL LD FOR [place] NAIL SYS NA [employer]WD [id number] Left 1 Implanted   NAIL IM L220MM [id number] SH UNIV PERITROCHANTERIC FEM TI - SNA  NAIL IM L220MM [id number] SH UNIV PERITROCHANTERIC FEM TI NA [employer]WD [id number] Left 1 Implanted       Closure: Primary    Complications: None    Signed By: Carolina Perkins MD (3/19/2021 at 8:53 AM)

## 2021-03-24 NOTE — PROGRESS NOTES
Hospitalist Progress Note          Haritha Bravo NP  Please call  and page for questions. Call physician on-call through the  7pm-7am    Daily Progress Note: 3/23/2021    Primary care provider:Frankie Herrmann MD    Date of admission: 3/18/2021 12:32 PM    Admission Summary:     From H&P 3/18/2021 12:32 PM:  Magi Snell is a 80 y.o. female who presents with after a fall  History is primary obtained from the patient and her daughter was present at the bedside  Patient reports that she fell this morning. Patient reports that her Rollator fell and she went down with her. Patient reports she did not hit her head. Patient reports that immediately she started having pain in her left hip. Patient got concerned and decided to come to the hospital.  Patient is found to have a left hip fracture and was requested to be admitted to the hospital service. Patient denies any other complaints or problems. The patient denies any Headache, blurry vision, sore throat, trouble swallowing, trouble with speech, chest pain, SOB, cough, fever, chills, N/V/D, abd pain, urinary symptoms, constipation, recent travels, sick contacts, focal or generalized neurological symptoms,, injuries, rashes, contact with COVID-19 diagnosed patients, hematemesis, melena, hemoptysis, hematuria, rashes, denies starting any new medications and denies any other concerns or problems besides as mentioned above. \"    Hospital Course:     03/19: Left IM hip nailing    03/21: Hgb 7.4 w/ dizziness, nausea, fatigue, low/normal BP (hgb 12 POA) Rec'd 1u PRBC    Subjective:   F/U on L hip fracture    Pt seen today in bed in [facility]. She c/o chronic neck pain and some dizziness which her daughter reported was also chronic. Doing better today after 1u blood yesterday. Auth pending for d/c back to [facility].     Assessment/Plan:       Left femoral fracture: s/p IM nailing (02/19)  -Resolved  -Pain control  -PT/OT per ortho  -Appreciate ortho input    Abnormal UA: UTI ruled out  -Urine culture w/ no growth    Dehydration  -Resolved w/ IVF  -Encourage PO intake    ABL Anemia         -Resolving           (3/19)   (3/20)   (3/21)                (3/21)    (3/22)  -Hgb POA 12.1-->10.1---->8.6---->7.4--1u prbc-->10.4---->9.8  -Monitor HH and transfuse less than 7    Parkinsons: w/ intermittent confusion  -Stable, per daughter confusion started w/ COVID isolation and has increased some in last few months  -cont sinimet  -supportive care  -fall precuations  -judicial use of opiates    DVTppx: SCDs, lovenox  Gippx: NA  Code Status: Full code  Diet: Regular  Activity: OOB to chair TID and PRN  Discharge: Plan to discharge back to [facility] for SNF services when auth is received; next 1-2 days          Review of Systems:     Full ROS complete with pertinent positives and negatives as per HPI, otherwise negative    Objective:   Physical Exam:     Visit Vitals  /68 (BP 1 Location: Right upper arm, BP Patient Position: At rest;Supine)   Pulse 63   Temp 99 °F (37.2 °C)   Resp 16   Ht 5' 5\" (1.651 m)   Wt 55.8 kg (123 lb)   SpO2 93%   BMI 20.47 kg/m²    O2 Flow Rate (L/min): 2 l/min O2 Device: Room air    Temp (24hrs), Av.3 °F (36.8 °C), Min:97.9 °F (36.6 °C), Max:99 °F (37.2 °C)    No intake/output data recorded.  0701 -  1900  In: -   Out: 600 [Urine:600]      General:  Alert, cooperative, no distress, appears stated age. Lungs:   Clear to auscultation bilaterally. Heart:  Regular rate and rhythm, S1, S2 normal, no murmur, click, rub or gallop. Abdomen:   Soft, non-tender, non-distended. Bowel sounds normal.    Extremities: Extremities normal, atraumatic, no cyanosis or edema. Left hip dressing c/d/i   Skin: Skin color, texture, turgor normal. No rashes or lesions   Neurologic: CNII-XII intact. Equal Strength. ANGULO.  A&Ox2-3 w/ intermittent confusion     Data Review:       Recent Days:  Recent Labs     21  0144 03/22/21  0528 03/21/21  1923 03/21/21  0645   WBC 9.2 9.0  --  8.9   HGB 9.5* 9.8* 10.4* 7.4*   HCT 29.1* 30.8* 32.3* 23.4*    181  --  152     Recent Labs     03/23/21  0148 03/22/21  0528 03/21/21  0645    138 140   K 4.0 4.3 3.8    107 109*   CO2 27 28 26   * 101* 104*   BUN 17 16 17   CREA 0.55 0.61 0.55   CA 9.0 9.1 8.6     No results for input(s): PH, PCO2, PO2, HCO3, FIO2 in the last 72 hours. 24 Hour Results:  Recent Results (from the past 24 hour(s))   CBC WITH AUTOMATED DIFF    Collection Time: 03/23/21  1:48 AM   Result Value Ref Range    WBC 9.2 3.6 - 11.0 K/uL    RBC 3.13 (L) 3.80 - 5.20 M/uL    HGB 9.5 (L) 11.5 - 16.0 g/dL    HCT 29.1 (L) 35.0 - 47.0 %    MCV 93.0 80.0 - 99.0 FL    MCH 30.4 26.0 - 34.0 PG    MCHC 32.6 30.0 - 36.5 g/dL    RDW 14.6 (H) 11.5 - 14.5 %    PLATELET 109 534 - 428 K/uL    MPV 11.5 8.9 - 12.9 FL    NRBC 0.0 0  WBC    ABSOLUTE NRBC 0.00 0.00 - 0.01 K/uL    NEUTROPHILS 72 32 - 75 %    LYMPHOCYTES 12 12 - 49 %    MONOCYTES 11 5 - 13 %    EOSINOPHILS 3 0 - 7 %    BASOPHILS 1 0 - 1 %    IMMATURE GRANULOCYTES 1 (H) 0.0 - 0.5 %    ABS. NEUTROPHILS 6.7 1.8 - 8.0 K/UL    ABS. LYMPHOCYTES 1.1 0.8 - 3.5 K/UL    ABS. MONOCYTES 1.0 0.0 - 1.0 K/UL    ABS. EOSINOPHILS 0.3 0.0 - 0.4 K/UL    ABS. BASOPHILS 0.1 0.0 - 0.1 K/UL    ABS. IMM.  GRANS. 0.1 (H) 0.00 - 0.04 K/UL    DF AUTOMATED     METABOLIC PANEL, BASIC    Collection Time: 03/23/21  1:48 AM   Result Value Ref Range    Sodium 136 136 - 145 mmol/L    Potassium 4.0 3.5 - 5.1 mmol/L    Chloride 103 97 - 108 mmol/L    CO2 27 21 - 32 mmol/L    Anion gap 6 5 - 15 mmol/L    Glucose 106 (H) 65 - 100 mg/dL    BUN 17 6 - 20 MG/DL    Creatinine 0.55 0.55 - 1.02 MG/DL    BUN/Creatinine ratio 31 (H) 12 - 20      GFR est AA >60 >60 ml/min/1.73m2    GFR est non-AA >60 >60 ml/min/1.73m2    Calcium 9.0 8.5 - 10.1 MG/DL       Problem List:  Problem List as of 3/23/2021 Never Reviewed          Codes Class Noted - Resolved    Hip fracture [facility]) ICD-10-CM: S72.009A  ICD-9-CM: 820.8  3/18/2021 - Present              Medications reviewed  Current Facility-Administered Medications   Medication Dose Route Frequency    0.9% sodium chloride infusion 250 mL  250 mL IntraVENous PRN    traMADoL (ULTRAM) tablet 50 mg  50 mg Oral Q4H PRN    HYDROcodone-acetaminophen (NORCO) 5-325 mg per tablet 1 Tab  1 Tab Oral Q4H PRN    dorzolamide (TRUSOPT) 2 % ophthalmic solution 1 Drop  1 Drop Both Eyes TID    And    timolol (TIMOPTIC) 0.5 % ophthalmic solution 1 Drop  1 Drop Both Eyes BID    sodium chloride (NS) flush 5-40 mL  5-40 mL IntraVENous Q8H    sodium chloride (NS) flush 5-40 mL  5-40 mL IntraVENous PRN    naloxone (NARCAN) injection 0.4 mg  0.4 mg IntraVENous PRN    calcium-vitamin D (OS-KOSTAS +D3) 500 mg-200 unit per tablet 1 Tab  1 Tab Oral TID WITH MEALS    senna-docusate (PERICOLACE) 8.6-50 mg per tablet 1 Tab  1 Tab Oral BID    polyethylene glycol (MIRALAX) packet 17 g  17 g Oral DAILY    bisacodyL (DULCOLAX) suppository 10 mg  10 mg Rectal DAILY PRN    acetaminophen (TYLENOL) tablet 650 mg  650 mg Oral Q6H    enoxaparin (LOVENOX) injection 40 mg  40 mg SubCUTAneous DAILY    carbidopa-levodopa (SINEMET)  mg per tablet 1 Tab  1 Tab Oral TID    sertraline (ZOLOFT) tablet 50 mg  50 mg Oral QHS    latanoprost (XALATAN) 0.005 % ophthalmic solution 1 Drop  1 Drop Both Eyes QHS    ondansetron (ZOFRAN) injection 4 mg  4 mg IntraVENous Q4H PRN    lidocaine 4 % patch 1 Patch  1 Patch TransDERmal Q24H       Care Plan discussed with: Patient/family, nurse      Velia President, NP  Hospitalist  Providers can reach me on PerfectServe

## 2021-03-24 NOTE — DISCHARGE SUMMARY
Discharge Summary       PATIENT ID: Daria Bright  MRN: [id number]   YOB: 1931    DATE OF ADMISSION: 3/18/2021 12:32 PM    DATE OF DISCHARGE: 03/24/21  PRIMARY CARE PROVIDER: Dakota Sanchez MD     ATTENDING PHYSICIAN: Dr. Dennis Aguirre  DISCHARGING PROVIDER: Sher Salinas NP    To contact this individual call [phone] and ask the  to page. If unavailable ask to be transferred the Adult Hospitalist Department. CONSULTATIONS: None    PROCEDURES/SURGERIES: Procedure(s):  LEFT FEMUR INSERTION INTRA MEDULLARY NAIL  ESSENTIAL    ADMITTING DIAGNOSES & HOSPITAL COURSE:     Hip fracture ([facility] [address] [S72.009A]     From H&P 3/18/2021 12:32 PM:  \"Linda Gillis is a 80 y.o. female who presents with after a fall  History is primary obtained from the patient and her daughter was present at the bedside  Patient reports that she fell this morning.  Patient reports that her Rollator fell and she went down with her. Christi Goltz reports she did not hit her head.  Patient reports that immediately she started having pain in her left hip.  Patient got concerned and decided to come to the hospital. Christi Goltz is found to have a left hip fracture and was requested to be admitted to the hospital service.  Patient denies any other complaints or problems. The patient denies any Headache, blurry vision, sore throat, trouble swallowing, trouble with speech, chest pain, SOB, cough, fever, chills, N/V/D, abd pain, urinary symptoms, constipation, recent travels, sick contacts, focal or generalized neurological symptoms,, injuries, rashes, contact with COVID-19 diagnosed patients, hematemesis, melena, hemoptysis, hematuria, rashes, denies starting any new medications and denies any other concerns or problems besides as mentioned above. \"    03/19: Left IM hip nailing     03/21: Hgb 7.4 w/ dizziness, nausea, fatigue, low/normal BP (hgb 12 POA) Rec'd 1u PRBC    Pt is being discharged today.  Discussed discharge with patient, specifically d/c instructions, recommendations, follow ups and medications. All questions answered and patient verbalized understanding. At this time the patient denies HA, dizziness, visual disturbance, cough, CP, SOB, abd pain, n/v/d or dysuria. Discussed with Dr. Fanny Agee. DISCHARGE DIAGNOSES / PLAN:      Left femoral fracture: s/p IM nailing (02/19)  -Resolved  -Pain control  -PT/OT per ortho  -F/U with PCP, ortho     Abnormal UA: UTI ruled out  -Urine culture w/ no growth     Dehydration  -Resolved w/ IVF  -Encourage PO intake     ABL Anemia         -Resolving, stable hgb  -no further bleeding, VSS  -f/u with PCP     Parkinsons: w/ intermittent confusion  -Stable, per daughter confusion started w/ COVID isolation and has increased some in last few months  -cont sinimet       ADDITIONAL CARE RECOMMENDATIONS:   It is imperative to follow up as instructed for further assessment and management of your acute and chronic health conditions. Please also read the attached educational handouts as they contain important information and instructions about your care.       PENDING TEST RESULTS:   At the time of discharge the following test results are still pending: NA    FOLLOW UP APPOINTMENTS:    Follow-up Information     Follow up With Specialties Details Why [address]  Schedule an appointment as soon as possible for a visit SNF rehab [address] [facility] [address]    Oscar Bernal MD Family Medicine Schedule an appointment as soon as possible for a visit hospital follow up; anxiety, anemia, received 1u [facility] [phone] [phone]      Carolina Reed MD (Jody) Orthopedic Surgery Schedule an appointment as soon as possible for a visit hip fracture follow up [address]  [phone]             DIET: Regular    ACTIVITY: WBAT LLE and per PT/Therapy    WOUND CARE: Left hip dressing: May leave dressing in place if remains C/D/I. Change prn for saturation with dry sterile dressing. EQUIPMENT NEEDED: Has needed equipment/per PT/therapy/ortho    DISCHARGE MEDICATIONS:  Current Discharge Medication List      START taking these medications    Details   enoxaparin (LOVENOX) 40 mg/0.4 mL 0.4 mL by SubCUTAneous route daily for 5 days. Qty: 2 mL, Refills: 0      lidocaine 4 % patch Apply patch to L hip . Apply patch to the affected area for 12 hours a day and remove for 12 hours a day. Qty: 6 Patch, Refills: 0      senna-docusate (PERICOLACE) 8.6-50 mg per tablet Take 1 Tab by mouth two (2) times a day. Qty:           CONTINUE these medications which have CHANGED    Details   !! acetaminophen (TYLENOL) 325 mg tablet Take 2 Tabs by mouth every six (6) hours. Qty:        traMADoL (ULTRAM) 50 mg tablet Take 1 Tab by mouth every eight (8) hours as needed for Pain for up to 7 days. Max Daily Amount: 150 mg.  Qty: 21 Tab, Refills: 0    Associated Diagnoses: Closed fracture of left hip, initial encounter ([facility] [address]       ! ! - Potential duplicate medications found. Please discuss with provider. CONTINUE these medications which have NOT CHANGED    Details   dextran 70-hypromellose (Artificial Tears,[id number],) ophthalmic solution Administer 1 Drop to both eyes every two (2) hours as needed for Other (Dry eyes). guaiFENesin (ROBITUSSIN) 100 mg/5 mL liquid Take 200 mg by mouth every four (4) hours as needed for Cough. albuterol-ipratropium (DUO-NEB) 2.5 mg-0.5 mg/3 ml nebu 3 mL by Nebulization route every four (4) hours as needed for Wheezing. ondansetron (Zofran ODT) 4 mg disintegrating tablet Take 4 mg by mouth every six (6) hours as needed for Nausea or Nausea or Vomiting.      latanoprost (XALATAN) 0.005 % ophthalmic solution Administer 1 Drop to both eyes nightly. sertraline (ZOLOFT) 50 mg tablet Take 50 mg by mouth nightly.       cholecalciferol, vitamin D3, (Vitamin D3) 50 mcg (2,000 unit) tab Take 2,000 Units by mouth daily. camphor-methyl salicyl-menthoL (Salonpas Deep Relieving) 3.1-15-10 % gel by Apply Externally route three (3) times daily. carbidopa-levodopa (SINEMET)  mg per tablet TAKE ONE TABLET BY MOUTH 3 TIMES A DAY  Qty: 90 Tab, Refills: 11    Associated Diagnoses: PD (Parkinson's disease) ([facility])      cyanocobalamin (VITAMIN B12) 1,000 mcg/mL injection 1,000 mcg by IntraMUSCular route Once every 2 weeks. docusate sodium (COLACE) 100 mg capsule Take 100 mg by mouth daily as needed. dorzolamide-timolol (COSOPT) 22.3-6.8 mg/mL ophthalmic solution Administer 1 Drop to both eyes two (2) times a day. loperamide (IMODIUM) 2 mg capsule Take 2 mg by mouth four (4) times daily as needed for Diarrhea.      meloxicam (MOBIC) 7.5 mg tablet Take 7.5 mg by mouth daily. omeprazole (PRILOSEC) 20 mg capsule Take 20 mg by mouth daily. !! acetaminophen (TYLENOL EXTRA STRENGTH) 500 mg tablet Take 1,000 mg by mouth two (2) times a day. !! - Potential duplicate medications found. Please discuss with provider. STOP taking these medications       LORazepam (ATIVAN) 0.5 mg tablet Comments:   Reason for Stopping:         lidocaine (LIDODERM) 5 % Comments:   Reason for Stopping:               NOTIFY YOUR PHYSICIAN FOR ANY OF THE FOLLOWING:   Fever over 101 degrees for 24 hours. Chest pain, shortness of breath, fever, chills, nausea, vomiting, diarrhea, change in mentation, falling, weakness, bleeding. Severe pain or pain not relieved by medications. Or, any other signs or symptoms that you may have questions about.     DISPOSITION:    Home With:   OT  PT  HH  RN      X Long term SNF/Inpatient Rehab    Independent/assisted living    Hospice    Other:       PATIENT CONDITION AT DISCHARGE:     Functional status    Poor    X Deconditioned     Independent      Cognition     Lucid     Forgetful    X Dementia      Catheters/lines (plus indication)    Goodson     PICC PEG    X None      Code status    X Full code     DNR      PHYSICAL EXAMINATION AT DISCHARGE:  General:  Alert, cooperative, no distress, appears stated age. Lungs:   Clear to auscultation bilaterally. Heart:  Regular rate and rhythm, S1, S2 normal, no murmur, click, rub or gallop. Abdomen:   Soft, non-tender, non-distended. Bowel sounds normal.    Extremities: Extremities normal, atraumatic, no cyanosis or edema. Left hip dressing c/d/i   Skin: Skin color, texture, turgor normal. No rashes or lesions   Neurologic: CNII-XII intact. Equal Strength. ANGULO.  A&Ox2-3 w/ intermittent confusion     /64 (BP 1 Location: Left upper arm)   Pulse 68   Temp 98.7 °F (37.1 °C)   Resp 14   Ht 5' 5\" (1.651 m)   Wt 55.8 kg (123 lb)   SpO2 90%   BMI 20.47 kg/m²       CHRONIC MEDICAL DIAGNOSES:  Problem List as of 3/24/2021 Date Reviewed: 3/24/2021          Codes Class Noted - Resolved    Arthritis ICD-10-CM: M19.90  ICD-9-CM: 716.90  Unknown - Present        Parkinson disease ([facility] ICD-10-CM: G20  ICD-9-CM: 332.0  Unknown - Present        Dehydration ICD-10-CM: E86.0  ICD-9-CM: 276.51  Unknown - Present        Acute blood loss as cause of postoperative anemia ICD-10-CM: D62  ICD-9-CM: 285.1  Unknown - Present        * (Principal) Hip fracture ([facility] ICD-10-CM: S72.009A  ICD-9-CM: 820.8  3/18/2021 - Present        DDD (degenerative disc disease), lumbar ICD-10-CM: M51.36  ICD-9-CM: 722.52  1/14/2019 - Present        Low back pain ICD-10-CM: M54.5  ICD-9-CM: 724.2  1/14/2019 - Present        Other idiopathic scoliosis, thoracolumbar region ICD-10-CM: M41.25  ICD-9-CM: 737.30  1/14/2019 - Present        Bilateral dry eyes ICD-10-CM: X14.042  ICD-9-CM: 375.15  2/19/2018 - Present        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

## 2021-03-24 NOTE — DISCHARGE INSTRUCTIONS
Discharge Instructions       PATIENT ID: Liyah Mejia  MRN: [id number]   YOB: 1931    DATE OF ADMISSION: 3/18/2021 12:32 PM    DATE OF DISCHARGE: 3/24/2021    PRIMARY CARE PROVIDER: Nghia Webster MD     ATTENDING PHYSICIAN: Antwan Corey MD  DISCHARGING PROVIDER: Inocente Brown NP    To contact this individual call [phone] and ask the  to page. If unavailable ask to be transferred the Adult Hospitalist Department.     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    DDD (degenerative disc disease), lumbar M51.36    Low back pain M54.5    Other idiopathic scoliosis, thoracolumbar region M41.25    Posterior capsular opacification visually significant of right eye H26.491    Parkinson disease ([facility] [address] G20    Dehydration E86.0    Acute blood loss as cause of postoperative anemia D62        CONSULTATIONS: None    PROCEDURES/SURGERIES: Procedure(s):  LEFT FEMUR INSERTION INTRA MEDULLARY NAIL  ESSENTIAL    PENDING TEST RESULTS:   At the time of discharge the following test results are still pending: NA    FOLLOW UP APPOINTMENTS:   Follow-up Information     Follow up With Specialties Details Why Contact Nel Moser  Schedule an appointment as soon as possible for a visit [facility] rehab [address] [facility] [address]    Nghia Webster MD Family Medicine Schedule an appointment as soon as possible for a visit hospital follow up; anxiety, anemia, received 1u [facility] [phone] Carolina Almanza MD (Jody) Orthopedic Surgery Schedule an appointment as soon as possible for a visit hip fracture follow up [address] [facility]  [address] [place] [address] RECOMMENDATIONS:  It is imperative to follow up as instructed for further assessment and management of your acute and chronic health conditions. Please also read the attached educational handouts as they contain important information and instructions about your care. DIET: Regular    ACTIVITY: WBAT LLE and per PT/Therapy    WOUND CARE: Left hip dressing: May leave dressing in place if remains C/D/I. Change prn for saturation with dry sterile dressing. EQUIPMENT NEEDED: Has needed equipment/per PT/therapy/ortho      DISCHARGE MEDICATIONS:   See Medication Reconciliation Form    · It is important that you take the medication exactly as they are prescribed. · Keep your medication in the bottles provided by the pharmacist and keep a list of the medication names, dosages, and times to be taken in your wallet. · Do not take other medications without consulting your doctor. NOTIFY YOUR PHYSICIAN FOR ANY OF THE FOLLOWING:   Fever over 101 degrees for 24 hours. Chest pain, shortness of breath, fever, chills, nausea, vomiting, diarrhea, change in mentation, falling, weakness, bleeding. Severe pain or pain not relieved by medications. Or, any other signs or symptoms that you may have questions about. DISPOSITION:    Home With:   OT  PT  HH  RN      X SNF/Inpatient Rehab/LTAC    Independent/assisted living    Hospice    Other:         Signed:   Eddie Charles NP  3/24/2021  2:07 PM      Post op Discharge Instructions Hip Fracture   Lyndsay Agusto, [facility]  [phone]    Patient Name: Monalisa Montanez  Date of admission:  3/18/2021  Date of procedure: 3/19/2021   Procedure: Procedure(s):  LEFT FEMUR INSERTION INTRA MEDULLARY NAIL  ESSENTIAL  PCP: Maxim Javed MD  Date of discharge: No discharge date for patient encounter. Follow up office visit   See Dr. Michelle Smith approximately 3-4 weeks from date of surgery. Call [phone] to make an appointment.     Activity  Walk with your walker with weight bearing restrictions as instructed by your physical therapist (partial weight bearing on your surgical leg). Continue using your walker until seen for follow-up visit. You should walk daily with the physical therapist  Perform your exercise routine 3 times a day as instructed by the physical therapist.    Dayana Counts a dressing on your incision and change daily. Wash hands thoroughly before changing the dressing. Once the incision is not draining, you may leave it open to air  May take a shower when the incision is dry, generally about a week after surgery    Preventing blood clots  Lovenox injections 40mg sub q once daily until 10 days post-op  Wear elastic stockings (TEDS) for 4 weeks. Remove them for approximately 1 hour daily for showering/sponge bathing    Pain management  Continue pain medication as prescribed in the hospital  Continue home medications per medication reconciliation  Place an ice bag on the hip for 15-20 minutes after exercising and as needed throughout the day and night    Diet  Resume usual diet; encourage fluids; provide foods high in fiber, calcium and vitamin D  Provide stool softeners/laxatives as needed                    After [facility] for SNF/Rehab (to be followed by post-acute provider)    Nursing  Complete head to toe assessment, vital signs  Medication reconciliation  Review pain management  Manage chronic medical conditions  Remove Aquacel dressing 7 days after surgery  Remove staples and apply steri-strips two weeks after surgery    Physical Therapy-status at discharge from the hospital    Weight bearing status:  Precautions at Admission: WBAT  Left Side Weight Bearing: As tolerated  Right Side Weight Bearing: Full    Mobility Status:  Supine to Sit: Moderate assistance, Additional time, Assist x1  Sit to Stand:  Total assistance, Other (comment)(unable to facilitate stand with total assist of 1)  Sit to Supine: (remained OOB in chair)  Bed to Chair: Minimum assistance, Moderate assistance, Assist x2    Gait:  Distance (ft): 1 Feet (ft)  Ambulation - Level of Assistance: Moderate assistance, Assist x2(bed>chair)  Assistive Device: Gait belt, Walker, rolling  Gait Abnormalities: Antalgic, Decreased step clearance, Step to gait    ADL status overall composite:     Dressing Assistance: Total assistance(dependent)  Bathing Assistance: (P) Maximum assistance  Toilet Transfer : Moderate assistance, Assist x2, Additional time, Adaptive equipment    Physical Therapy-exercises, transfers, gait-training (partial weight bearing on the surgical leg)    Exercises related to strengthening the surgical hip:  Supine Exercises: Ankle pumps  Quad Sets  Gluteal Sets  Hip Abduction slides supine  Hip external rotation  Heel Slides    Standing Exercises:  Heel Raises  Mini-squats  Heel/toe touches and knee bend  Marching   Hip Abduction  Hip External Rotation  Hip Extension    Advance exercises with equipment for strengthening, flexibility, balance needs as appropriate per setting    Repetitions and number of sets to be established per patient tolerance     Reasons to call the Surgeon  1. Increased redness, swelling or drainage from the incision site  2. Temperature consistently greater than 101 degrees  3.   Increased pain or unrelieved pain in hip or calf

## 2021-03-24 NOTE — PROGRESS NOTES
TRANSFER - OUT REPORT:    Verbal report given to Barrett(name) on Jorge Ashton  being transferred to [facility]) for routine progression of care       Report consisted of patients Situation, Background, Assessment and   Recommendations(SBAR). Information from the following report(s) SBAR was reviewed with the receiving nurse. Lines:       Opportunity for questions and clarification was provided.       Patient transported with:   RAMESH

## 2021-03-24 NOTE — PROGRESS NOTES
EMERALD: Plan for discharge to [facility] healthcare unit today. Insurance Garfield Ho was approved. [facility] phone [phone] transport time set for 12:30 PM today. [facility] transport is delayed until 1:45. CM updated RN and the juan carlos Barrett. CM spoke with Melly at [facility]. They can accept today. CM notified hospitalist. CM requested transport with [facility]. CM called the juan carlos Barrett #[phone], she is aware of discharge and transport time. Medicare pt has received, reviewed, and signed 2nd IM letter informing them of their right to appeal the discharge. Signed copy has been placed on pt bedside chart. Transition of Care Plan to SNF/Rehab    SNF/Rehab Transition:  Patient has been accepted to [facility] and meets criteria for admission. Patient will transported by [facility] and expected to leave at 12:30 PM.    Communication to Patient/Family:  Met with patient and spoke with juan carlos Barrett and they are agreeable to the transition plan. Communication to SNF/Rehab:  Bedside RN, Krissy Cordova, has been notified to update the transition plan to the facility and call report (phone number #[phone]). Discharge information has been updated on the AVS.     Discharge instructions to be fax'd to facility at [facility] # [phone]. Nursing Please include all hard scripts for controlled substances, med rec and dc summary, and AVS in packet. Reviewed and confirmed with facility, , can manage the patient care needs for the following:     SNF/Rehab Transition:  Patient to follow-up with Home Health:  [facility], other  ,none)  PCP/Specialist: f/u after SNF    Reviewed and confirmed with facility, they can manage the patient care needs for the following:     Marlen Blum with (X) only those applicable:    Medication:  []  Medications will be available at the facility  []  IV Antibiotics  []  Controlled Substance - hard copy to be sent with patient   []  Weekly Labs Documents:  [] Hard RX  [] MAR  [] Kardex  [] AVS  []Transfer Summary  []Discharge   Equipment:  []  CPAP/BiPAP  []  Wound Vacuum  []  Goodson or Urinary Device  []  PICC/Central Line  []  Nebulizer  []  Ventilator   Treatment:  []Isolation (for MRSA, VRE, etc.)  []Surgical Drain Management  []Tracheostomy Care  []Dressing Changes  []Dialysis with transportation and chair time   []PEG Care  []Oxygen  []Daily Weights for Heart Failure   Dietary:  []Any diet limitations  []Tube Feedings   []Total Parenteral Management (TPN)   Eligible for Medicaid Long Term Services and Supports  Yes:  [] Eligible for medical assistance or will become eligible within 180 days and UAI completed. [] Provider/Patient and/or support system has requested screening. [] UAI copy provided to patient or responsible party  [] UAI unavailable at discharge will send once processed to SNF provider. [] UAI unavailable at discharged mailed to patient  No:   [] Private pay and is not financially eligible for Medicaid within the next 180 days. [] Reside out-of-state.   [] A residents of a state owned/operated facility that is licensed  by Department [facility] or [facility]  [] Enrollment in [facility] hospice services  [] [address] [place] [address] citizen  [x] Patient /Family declines to have screening completed or provide financial information for screening     Financial Resources:  Medicaid    [] Initiated and application pending   [] Full coverage     Advanced Care Plan:  []Surrogate Decision Maker of Care  []POA  []Communicated Code Status  (DDNR\", \"Full\")    Other         KALPANA Grey/LIZZ

## 2021-03-24 NOTE — PROGRESS NOTES
Bedside and Verbal shift change report given to Talia Boyd RN (oncoming nurse) by Alonzo Wyatt RN (offgoing nurse). Report included the following information SBAR.

## 2021-03-24 NOTE — PROGRESS NOTES
Bedside and Verbal shift change report given to Jayden Dela Cruz RN (oncoming nurse) by Hernan Diego RN (offgoing nurse). Report included the following information SBAR, Kardex, MAR and Recent Results.

## 2022-03-18 PROBLEM — M41.25 OTHER IDIOPATHIC SCOLIOSIS, THORACOLUMBAR REGION: Status: ACTIVE | Noted: 2019-01-14

## 2022-03-18 PROBLEM — H40.1432: Status: ACTIVE | Noted: 2018-02-19

## 2022-03-19 PROBLEM — H04.123 BILATERAL DRY EYES: Status: ACTIVE | Noted: 2018-02-19

## 2022-03-19 PROBLEM — S72.009A HIP FRACTURE (HCC): Status: ACTIVE | Noted: 2021-03-18

## 2022-03-19 PROBLEM — H26.491 POSTERIOR CAPSULAR OPACIFICATION VISUALLY SIGNIFICANT OF RIGHT EYE: Status: ACTIVE | Noted: 2018-02-19

## 2022-03-19 PROBLEM — M54.50 LOW BACK PAIN: Status: ACTIVE | Noted: 2019-01-14

## 2022-03-20 PROBLEM — M51.36 DDD (DEGENERATIVE DISC DISEASE), LUMBAR: Status: ACTIVE | Noted: 2019-01-14

## 2023-05-19 RX ORDER — MELOXICAM 7.5 MG/1
7.5 TABLET ORAL DAILY
COMMUNITY

## 2023-05-19 RX ORDER — GUAIFENESIN 200 MG/10ML
200 LIQUID ORAL EVERY 4 HOURS PRN
COMMUNITY

## 2023-05-19 RX ORDER — ACETAMINOPHEN 325 MG/1
650 TABLET ORAL EVERY 6 HOURS
COMMUNITY
Start: 2021-03-24

## 2023-05-19 RX ORDER — PSEUDOEPHEDRINE HCL 30 MG
100 TABLET ORAL DAILY PRN
COMMUNITY

## 2023-05-19 RX ORDER — DORZOLAMIDE HYDROCHLORIDE AND TIMOLOL MALEATE 20; 5 MG/ML; MG/ML
1 SOLUTION/ DROPS OPHTHALMIC 2 TIMES DAILY
COMMUNITY

## 2023-05-19 RX ORDER — ACETAMINOPHEN 500 MG
1000 TABLET ORAL 2 TIMES DAILY
COMMUNITY

## 2023-05-19 RX ORDER — IPRATROPIUM BROMIDE AND ALBUTEROL SULFATE 2.5; .5 MG/3ML; MG/3ML
3 SOLUTION RESPIRATORY (INHALATION) EVERY 4 HOURS PRN
COMMUNITY

## 2023-05-19 RX ORDER — CYANOCOBALAMIN 1000 UG/ML
1000 INJECTION, SOLUTION INTRAMUSCULAR; SUBCUTANEOUS
COMMUNITY

## 2023-05-19 RX ORDER — OMEPRAZOLE 20 MG/1
20 CAPSULE, DELAYED RELEASE ORAL DAILY
COMMUNITY

## 2023-05-19 RX ORDER — LATANOPROST 50 UG/ML
1 SOLUTION/ DROPS OPHTHALMIC
COMMUNITY

## 2023-05-19 RX ORDER — ONDANSETRON 4 MG/1
4 TABLET, ORALLY DISINTEGRATING ORAL EVERY 6 HOURS PRN
COMMUNITY

## 2023-05-19 RX ORDER — AMOXICILLIN 250 MG
1 CAPSULE ORAL 2 TIMES DAILY
COMMUNITY
Start: 2021-03-24

## 2023-05-19 RX ORDER — LIDOCAINE 4 G/G
PATCH TOPICAL
COMMUNITY
Start: 2021-03-25

## 2023-05-19 RX ORDER — LOPERAMIDE HYDROCHLORIDE 2 MG/1
2 CAPSULE ORAL 4 TIMES DAILY PRN
COMMUNITY

## (undated) DEVICE — GOWN,PREVENTION PLUS,XLN/2XL,ST,22/CS: Brand: MEDLINE

## (undated) DEVICE — TOWEL SURG W17XL27IN STD BLU COT NONFENESTRATED PREWASHED

## (undated) DEVICE — SPONGE GZ W4XL4IN COT 12 PLY TYP VII WVN C FLD DSGN

## (undated) DEVICE — PADDING CST 4INX4YD --

## (undated) DEVICE — STERILE POLYISOPRENE POWDER-FREE SURGICAL GLOVES WITH EMOLLIENT COATING: Brand: PROTEXIS

## (undated) DEVICE — Device

## (undated) DEVICE — STERILE POLYISOPRENE POWDER-FREE SURGICAL GLOVES: Brand: PROTEXIS

## (undated) DEVICE — ROCKER SWITCH PENCIL BLADE ELECTRODE, HOLSTER: Brand: EDGE

## (undated) DEVICE — 6619 2 PTNT ISO SYS INCISE AREA&LT;(&GT;&&LT;)&GT;P: Brand: STERI-DRAPE™ IOBAN™ 2

## (undated) DEVICE — SUTURE VCRL SZ 1 L27IN ABSRB VLT L36MM CT-1 1/2 CIR J341H

## (undated) DEVICE — DRESSING PETRO W3XL8IN N ADH OIL EMUL GZ CURAD

## (undated) DEVICE — PAD,ABDOMINAL,5"X9",ST,LF,25/BX: Brand: MEDLINE INDUSTRIES, INC.

## (undated) DEVICE — GDWIRE THRD TIP 3.2X460MM COCR --

## (undated) DEVICE — SOL IRR SOD CL 0.9% 1000ML BTL --

## (undated) DEVICE — STRAP,POSITIONING,KNEE/BODY,FOAM,4X60": Brand: MEDLINE

## (undated) DEVICE — PACK,BASIC,SIRUS,V: Brand: MEDLINE

## (undated) DEVICE — SUTURE VCRL SZ 2-0 L36IN ABSRB UD L36MM CT-1 1/2 CIR J945H

## (undated) DEVICE — 3M™ TEGADERM™ TRANSPARENT FILM DRESSING FRAME STYLE, 1626W, 4 IN X 4-3/4 IN (10 CM X 12 CM), 50/CT 4CT/CASE: Brand: 3M™ TEGADERM™

## (undated) DEVICE — REM POLYHESIVE ADULT PATIENT RETURN ELECTRODE: Brand: VALLEYLAB

## (undated) DEVICE — BIT DRL L365MM DIA4.3MM CALIB NONRADIOLUCENT W/O STP DISP

## (undated) DEVICE — SURGICAL PROCEDURE PACK BASIN MAJ SET CUST NO CAUT

## (undated) DEVICE — SOLUTION SURG PREP 26 CC PURPREP

## (undated) DEVICE — HANDLE LT SNAP ON ULT DURABLE LENS FOR TRUMPF ALC DISPOSABLE

## (undated) DEVICE — PADDING CAST SOF-ROL 6INX4YD --